# Patient Record
Sex: FEMALE | Race: WHITE | NOT HISPANIC OR LATINO | URBAN - METROPOLITAN AREA
[De-identification: names, ages, dates, MRNs, and addresses within clinical notes are randomized per-mention and may not be internally consistent; named-entity substitution may affect disease eponyms.]

---

## 2024-08-10 ENCOUNTER — APPOINTMENT (OUTPATIENT)
Dept: RADIOLOGY | Facility: MEDICAL CENTER | Age: 24
DRG: 201 | End: 2024-08-10
Attending: SURGERY
Payer: COMMERCIAL

## 2024-08-10 ENCOUNTER — APPOINTMENT (OUTPATIENT)
Dept: RADIOLOGY | Facility: MEDICAL CENTER | Age: 24
DRG: 201 | End: 2024-08-10
Attending: EMERGENCY MEDICINE
Payer: COMMERCIAL

## 2024-08-10 ENCOUNTER — HOSPITAL ENCOUNTER (INPATIENT)
Facility: MEDICAL CENTER | Age: 24
LOS: 6 days | DRG: 201 | End: 2024-08-16
Attending: EMERGENCY MEDICINE | Admitting: SURGERY
Payer: COMMERCIAL

## 2024-08-10 DIAGNOSIS — J93.9 BILATERAL PNEUMOTHORACES: ICD-10-CM
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
## 2024-08-10 DIAGNOSIS — S27.0XXA TRAUMATIC PNEUMOTHORAX, INITIAL ENCOUNTER: ICD-10-CM

## 2024-08-10 PROBLEM — T14.90XA TRAUMA: Status: ACTIVE | Noted: 2024-08-10

## 2024-08-10 PROBLEM — Z78.9 NO CONTRAINDICATION TO DEEP VEIN THROMBOSIS (DVT) PROPHYLAXIS: Status: ACTIVE | Noted: 2024-08-10

## 2024-08-10 LAB
ABO GROUP BLD: NORMAL
ALBUMIN SERPL BCP-MCNC: 3.7 G/DL (ref 3.2–4.9)
ALBUMIN/GLOB SERPL: 1.5 G/DL
ALP SERPL-CCNC: 73 U/L (ref 30–99)
ALT SERPL-CCNC: 11 U/L (ref 2–50)
ANION GAP SERPL CALC-SCNC: 11 MMOL/L (ref 7–16)
APTT PPP: 22.4 SEC (ref 24.7–36)
AST SERPL-CCNC: 23 U/L (ref 12–45)
BILIRUB SERPL-MCNC: 0.2 MG/DL (ref 0.1–1.5)
BLD GP AB SCN SERPL QL: NORMAL
BUN SERPL-MCNC: 7 MG/DL (ref 8–22)
CALCIUM ALBUM COR SERPL-MCNC: 8.5 MG/DL (ref 8.5–10.5)
CALCIUM SERPL-MCNC: 8.3 MG/DL (ref 8.5–10.5)
CHLORIDE SERPL-SCNC: 106 MMOL/L (ref 96–112)
CO2 SERPL-SCNC: 21 MMOL/L (ref 20–33)
CREAT SERPL-MCNC: 0.59 MG/DL (ref 0.5–1.4)
ERYTHROCYTE [DISTWIDTH] IN BLOOD BY AUTOMATED COUNT: 48.5 FL (ref 35.9–50)
ETHANOL BLD-MCNC: <10.1 MG/DL
GFR SERPLBLD CREATININE-BSD FMLA CKD-EPI: 130 ML/MIN/1.73 M 2
GLOBULIN SER CALC-MCNC: 2.5 G/DL (ref 1.9–3.5)
GLUCOSE SERPL-MCNC: 147 MG/DL (ref 65–99)
HCG SERPL QL: NEGATIVE
HCT VFR BLD AUTO: 36.6 % (ref 37–47)
HGB BLD-MCNC: 12 G/DL (ref 12–16)
INR PPP: 1.17 (ref 0.87–1.13)
MCH RBC QN AUTO: 31.4 PG (ref 27–33)
MCHC RBC AUTO-ENTMCNC: 32.8 G/DL (ref 32.2–35.5)
MCV RBC AUTO: 95.8 FL (ref 81.4–97.8)
PLATELET # BLD AUTO: 154 K/UL (ref 164–446)
PMV BLD AUTO: 10.7 FL (ref 9–12.9)
POTASSIUM SERPL-SCNC: 3.3 MMOL/L (ref 3.6–5.5)
PROT SERPL-MCNC: 6.2 G/DL (ref 6–8.2)
PROTHROMBIN TIME: 15 SEC (ref 12–14.6)
RBC # BLD AUTO: 3.82 M/UL (ref 4.2–5.4)
RH BLD: NORMAL
SODIUM SERPL-SCNC: 138 MMOL/L (ref 135–145)
WBC # BLD AUTO: 8.5 K/UL (ref 4.8–10.8)

## 2024-08-10 PROCEDURE — 86901 BLOOD TYPING SEROLOGIC RH(D): CPT

## 2024-08-10 PROCEDURE — 305948 HCHG GREEN TRAUMA ACT PRE-NOTIFY NO CC

## 2024-08-10 PROCEDURE — 700117 HCHG RX CONTRAST REV CODE 255: Performed by: EMERGENCY MEDICINE

## 2024-08-10 PROCEDURE — 82077 ASSAY SPEC XCP UR&BREATH IA: CPT

## 2024-08-10 PROCEDURE — 36415 COLL VENOUS BLD VENIPUNCTURE: CPT

## 2024-08-10 PROCEDURE — 85730 THROMBOPLASTIN TIME PARTIAL: CPT

## 2024-08-10 PROCEDURE — 85610 PROTHROMBIN TIME: CPT

## 2024-08-10 PROCEDURE — 71045 X-RAY EXAM CHEST 1 VIEW: CPT

## 2024-08-10 PROCEDURE — 0W9B30Z DRAINAGE OF LEFT PLEURAL CAVITY WITH DRAINAGE DEVICE, PERCUTANEOUS APPROACH: ICD-10-PCS | Performed by: EMERGENCY MEDICINE

## 2024-08-10 PROCEDURE — 80053 COMPREHEN METABOLIC PANEL: CPT

## 2024-08-10 PROCEDURE — 96375 TX/PRO/DX INJ NEW DRUG ADDON: CPT

## 2024-08-10 PROCEDURE — 700105 HCHG RX REV CODE 258: Performed by: EMERGENCY MEDICINE

## 2024-08-10 PROCEDURE — 85027 COMPLETE CBC AUTOMATED: CPT

## 2024-08-10 PROCEDURE — 700101 HCHG RX REV CODE 250: Performed by: EMERGENCY MEDICINE

## 2024-08-10 PROCEDURE — 32551 INSERTION OF CHEST TUBE: CPT

## 2024-08-10 PROCEDURE — 70450 CT HEAD/BRAIN W/O DYE: CPT

## 2024-08-10 PROCEDURE — 700111 HCHG RX REV CODE 636 W/ 250 OVERRIDE (IP): Performed by: EMERGENCY MEDICINE

## 2024-08-10 PROCEDURE — 86850 RBC ANTIBODY SCREEN: CPT

## 2024-08-10 PROCEDURE — 71260 CT THORAX DX C+: CPT

## 2024-08-10 PROCEDURE — 700101 HCHG RX REV CODE 250: Performed by: NURSE PRACTITIONER

## 2024-08-10 PROCEDURE — 96365 THER/PROPH/DIAG IV INF INIT: CPT

## 2024-08-10 PROCEDURE — 73060 X-RAY EXAM OF HUMERUS: CPT | Mod: LT

## 2024-08-10 PROCEDURE — 86900 BLOOD TYPING SEROLOGIC ABO: CPT

## 2024-08-10 PROCEDURE — 700105 HCHG RX REV CODE 258: Performed by: NURSE PRACTITIONER

## 2024-08-10 PROCEDURE — 99285 EMERGENCY DEPT VISIT HI MDM: CPT

## 2024-08-10 PROCEDURE — 73090 X-RAY EXAM OF FOREARM: CPT | Mod: LT

## 2024-08-10 PROCEDURE — 770001 HCHG ROOM/CARE - MED/SURG/GYN PRIV*

## 2024-08-10 PROCEDURE — A9270 NON-COVERED ITEM OR SERVICE: HCPCS | Performed by: NURSE PRACTITIONER

## 2024-08-10 PROCEDURE — 700102 HCHG RX REV CODE 250 W/ 637 OVERRIDE(OP): Performed by: NURSE PRACTITIONER

## 2024-08-10 PROCEDURE — 84703 CHORIONIC GONADOTROPIN ASSAY: CPT

## 2024-08-10 RX ORDER — ONDANSETRON 4 MG/1
4 TABLET, ORALLY DISINTEGRATING ORAL EVERY 4 HOURS PRN
Status: DISCONTINUED | OUTPATIENT
Start: 2024-08-10 | End: 2024-08-16 | Stop reason: HOSPADM

## 2024-08-10 RX ORDER — MIDAZOLAM HYDROCHLORIDE 1 MG/ML
1-5 INJECTION INTRAMUSCULAR; INTRAVENOUS ONCE
Status: COMPLETED | OUTPATIENT
Start: 2024-08-10 | End: 2024-08-10

## 2024-08-10 RX ORDER — CELECOXIB 200 MG/1
200 CAPSULE ORAL 2 TIMES DAILY PRN
Status: DISCONTINUED | OUTPATIENT
Start: 2024-08-15 | End: 2024-08-16 | Stop reason: HOSPADM

## 2024-08-10 RX ORDER — OXYCODONE HYDROCHLORIDE 10 MG/1
10 TABLET ORAL
Status: DISCONTINUED | OUTPATIENT
Start: 2024-08-10 | End: 2024-08-16 | Stop reason: HOSPADM

## 2024-08-10 RX ORDER — DOCUSATE SODIUM 100 MG/1
100 CAPSULE, LIQUID FILLED ORAL 2 TIMES DAILY
Status: DISCONTINUED | OUTPATIENT
Start: 2024-08-10 | End: 2024-08-16 | Stop reason: HOSPADM

## 2024-08-10 RX ORDER — HYDROMORPHONE HYDROCHLORIDE 1 MG/ML
0.5 INJECTION, SOLUTION INTRAMUSCULAR; INTRAVENOUS; SUBCUTANEOUS ONCE
Status: COMPLETED | OUTPATIENT
Start: 2024-08-10 | End: 2024-08-10

## 2024-08-10 RX ORDER — ACETAMINOPHEN 500 MG
1000 TABLET ORAL EVERY 6 HOURS PRN
Status: DISCONTINUED | OUTPATIENT
Start: 2024-08-15 | End: 2024-08-16 | Stop reason: HOSPADM

## 2024-08-10 RX ORDER — LIDOCAINE HYDROCHLORIDE AND EPINEPHRINE BITARTRATE 20; .01 MG/ML; MG/ML
10 INJECTION, SOLUTION SUBCUTANEOUS ONCE
Status: COMPLETED | OUTPATIENT
Start: 2024-08-10 | End: 2024-08-10

## 2024-08-10 RX ORDER — METAXALONE 800 MG/1
800 TABLET ORAL 3 TIMES DAILY
Status: DISCONTINUED | OUTPATIENT
Start: 2024-08-10 | End: 2024-08-16 | Stop reason: HOSPADM

## 2024-08-10 RX ORDER — ENOXAPARIN SODIUM 100 MG/ML
30 INJECTION SUBCUTANEOUS EVERY 12 HOURS
Status: DISCONTINUED | OUTPATIENT
Start: 2024-08-11 | End: 2024-08-16 | Stop reason: HOSPADM

## 2024-08-10 RX ORDER — AMOXICILLIN 250 MG
1 CAPSULE ORAL NIGHTLY
Status: DISCONTINUED | OUTPATIENT
Start: 2024-08-10 | End: 2024-08-16 | Stop reason: HOSPADM

## 2024-08-10 RX ORDER — SODIUM CHLORIDE, SODIUM LACTATE, POTASSIUM CHLORIDE, CALCIUM CHLORIDE 600; 310; 30; 20 MG/100ML; MG/100ML; MG/100ML; MG/100ML
INJECTION, SOLUTION INTRAVENOUS CONTINUOUS
Status: DISCONTINUED | OUTPATIENT
Start: 2024-08-10 | End: 2024-08-11

## 2024-08-10 RX ORDER — LIDOCAINE 4 G/G
1 PATCH TOPICAL EVERY 24 HOURS
Status: DISCONTINUED | OUTPATIENT
Start: 2024-08-10 | End: 2024-08-16 | Stop reason: HOSPADM

## 2024-08-10 RX ORDER — GABAPENTIN 100 MG/1
100 CAPSULE ORAL EVERY 8 HOURS
Status: DISCONTINUED | OUTPATIENT
Start: 2024-08-10 | End: 2024-08-16 | Stop reason: HOSPADM

## 2024-08-10 RX ORDER — ACETAMINOPHEN 500 MG
1000 TABLET ORAL EVERY 6 HOURS
Status: DISPENSED | OUTPATIENT
Start: 2024-08-10 | End: 2024-08-15

## 2024-08-10 RX ORDER — HYDROMORPHONE HYDROCHLORIDE 1 MG/ML
0.5 INJECTION, SOLUTION INTRAMUSCULAR; INTRAVENOUS; SUBCUTANEOUS
Status: DISCONTINUED | OUTPATIENT
Start: 2024-08-10 | End: 2024-08-16 | Stop reason: HOSPADM

## 2024-08-10 RX ORDER — BISACODYL 10 MG
10 SUPPOSITORY, RECTAL RECTAL
Status: DISCONTINUED | OUTPATIENT
Start: 2024-08-10 | End: 2024-08-16 | Stop reason: HOSPADM

## 2024-08-10 RX ORDER — MIDAZOLAM HYDROCHLORIDE 1 MG/ML
INJECTION INTRAMUSCULAR; INTRAVENOUS
Status: COMPLETED | OUTPATIENT
Start: 2024-08-10 | End: 2024-08-10

## 2024-08-10 RX ORDER — AMOXICILLIN 250 MG
1 CAPSULE ORAL
Status: DISCONTINUED | OUTPATIENT
Start: 2024-08-10 | End: 2024-08-16 | Stop reason: HOSPADM

## 2024-08-10 RX ORDER — POLYETHYLENE GLYCOL 3350 17 G/17G
1 POWDER, FOR SOLUTION ORAL 2 TIMES DAILY
Status: DISCONTINUED | OUTPATIENT
Start: 2024-08-10 | End: 2024-08-16 | Stop reason: HOSPADM

## 2024-08-10 RX ORDER — MORPHINE SULFATE 4 MG/ML
INJECTION INTRAVENOUS
Status: COMPLETED | OUTPATIENT
Start: 2024-08-10 | End: 2024-08-10

## 2024-08-10 RX ORDER — CELECOXIB 200 MG/1
200 CAPSULE ORAL 2 TIMES DAILY
Status: COMPLETED | OUTPATIENT
Start: 2024-08-10 | End: 2024-08-15

## 2024-08-10 RX ORDER — OXYCODONE HYDROCHLORIDE 5 MG/1
5 TABLET ORAL
Status: DISCONTINUED | OUTPATIENT
Start: 2024-08-10 | End: 2024-08-16 | Stop reason: HOSPADM

## 2024-08-10 RX ORDER — ONDANSETRON 2 MG/ML
4 INJECTION INTRAMUSCULAR; INTRAVENOUS EVERY 4 HOURS PRN
Status: DISCONTINUED | OUTPATIENT
Start: 2024-08-10 | End: 2024-08-16 | Stop reason: HOSPADM

## 2024-08-10 RX ADMIN — LIDOCAINE 1 PATCH: 4 PATCH TOPICAL at 19:44

## 2024-08-10 RX ADMIN — MORPHINE SULFATE 4 MG: 4 INJECTION, SOLUTION INTRAMUSCULAR; INTRAVENOUS at 17:16

## 2024-08-10 RX ADMIN — SODIUM CHLORIDE, POTASSIUM CHLORIDE, SODIUM LACTATE AND CALCIUM CHLORIDE: 600; 310; 30; 20 INJECTION, SOLUTION INTRAVENOUS at 19:48

## 2024-08-10 RX ADMIN — MIDAZOLAM HYDROCHLORIDE 2 MG: 1 INJECTION, SOLUTION INTRAMUSCULAR; INTRAVENOUS at 17:15

## 2024-08-10 RX ADMIN — MIDAZOLAM HYDROCHLORIDE 2 MG: 2 INJECTION, SOLUTION INTRAMUSCULAR; INTRAVENOUS at 17:05

## 2024-08-10 RX ADMIN — MIDAZOLAM HYDROCHLORIDE 1 MG: 2 INJECTION, SOLUTION INTRAMUSCULAR; INTRAVENOUS at 17:11

## 2024-08-10 RX ADMIN — LIDOCAINE HYDROCHLORIDE,EPINEPHRINE BITARTRATE 10 ML: 20; .01 INJECTION, SOLUTION INFILTRATION; PERINEURAL at 17:48

## 2024-08-10 RX ADMIN — IOHEXOL 100 ML: 350 INJECTION, SOLUTION INTRAVENOUS at 17:25

## 2024-08-10 RX ADMIN — HYDROMORPHONE HYDROCHLORIDE 0.5 MG: 1 INJECTION, SOLUTION INTRAMUSCULAR; INTRAVENOUS; SUBCUTANEOUS at 17:46

## 2024-08-10 RX ADMIN — CELECOXIB 200 MG: 200 CAPSULE ORAL at 19:43

## 2024-08-10 RX ADMIN — GABAPENTIN 100 MG: 100 CAPSULE ORAL at 22:50

## 2024-08-10 RX ADMIN — CEFAZOLIN 2 G: 2 INJECTION, POWDER, FOR SOLUTION INTRAMUSCULAR; INTRAVENOUS at 20:11

## 2024-08-10 RX ADMIN — ACETAMINOPHEN 1000 MG: 500 TABLET ORAL at 19:43

## 2024-08-10 RX ADMIN — METAXALONE 800 MG: 800 TABLET ORAL at 19:43

## 2024-08-10 ASSESSMENT — COPD QUESTIONNAIRES
COPD SCREENING SCORE: 2
DURING THE PAST 4 WEEKS HOW MUCH DID YOU FEEL SHORT OF BREATH: NONE/LITTLE OF THE TIME
HAVE YOU SMOKED AT LEAST 100 CIGARETTES IN YOUR ENTIRE LIFE: YES
DO YOU EVER COUGH UP ANY MUCUS OR PHLEGM?: NO/ONLY WITH OCCASIONAL COLDS OR INFECTIONS

## 2024-08-10 ASSESSMENT — PAIN DESCRIPTION - PAIN TYPE: TYPE: ACUTE PAIN

## 2024-08-10 NOTE — LETTER
August 16, 2024       Patient: Kaylyn Peña   YOB: 2000   Date of Visit: 8/3/2024         To Whom It May Concern:    In my medical opinion, I recommend that Kaylyn Peña return to work on light duty with the following restrictions no heavy lifting greater than 20 lb for 1 week.     If you have any questions or concerns, please don't hesitate to call            Sincerely,      Bernadine TARANGO.

## 2024-08-10 NOTE — ASSESSMENT & PLAN NOTE
Left sided pneumothorax.  Chest tube placed in ED  -20 suction  8/11 trace left pneumothorax on morning CXR.  -Grade 1 air leak, scant output.   -Continue to suction.   8/12 No pneumothorax  on morning CXR. Scant serous output.  -chest tube to water seal  8/13 trace left pneumothorax on morning CXR, increase in subcutaneous air.   8/14 trace BILATERAL pneumothorax. Left decreased in size, right new.   -No air leak, no output to left chest tube  -Continue chest tube to water seal.   8/15 Bilateral penumothoraces stable.  -continue chest tube to water seal left chest tube.   8/16 Chest tube dislodge overnight into subcutaneous tissue. Removed with out complication.  -CXR this afternoon.   Aggressive pulmonary hygiene and multimodal pain management and serial chest radiography.

## 2024-08-11 ENCOUNTER — APPOINTMENT (OUTPATIENT)
Dept: RADIOLOGY | Facility: MEDICAL CENTER | Age: 24
DRG: 201 | End: 2024-08-11
Attending: NURSE PRACTITIONER
Payer: COMMERCIAL

## 2024-08-11 LAB
ABO + RH BLD: NORMAL
ANION GAP SERPL CALC-SCNC: 10 MMOL/L (ref 7–16)
BASOPHILS # BLD AUTO: 0.4 % (ref 0–1.8)
BASOPHILS # BLD: 0.03 K/UL (ref 0–0.12)
BUN SERPL-MCNC: 5 MG/DL (ref 8–22)
CALCIUM SERPL-MCNC: 8.6 MG/DL (ref 8.5–10.5)
CHLORIDE SERPL-SCNC: 106 MMOL/L (ref 96–112)
CO2 SERPL-SCNC: 24 MMOL/L (ref 20–33)
CREAT SERPL-MCNC: 0.55 MG/DL (ref 0.5–1.4)
EOSINOPHIL # BLD AUTO: 0.1 K/UL (ref 0–0.51)
EOSINOPHIL NFR BLD: 1.2 % (ref 0–6.9)
ERYTHROCYTE [DISTWIDTH] IN BLOOD BY AUTOMATED COUNT: 49.5 FL (ref 35.9–50)
GFR SERPLBLD CREATININE-BSD FMLA CKD-EPI: 132 ML/MIN/1.73 M 2
GLUCOSE BLD STRIP.AUTO-MCNC: 96 MG/DL (ref 65–99)
GLUCOSE SERPL-MCNC: 106 MG/DL (ref 65–99)
HCT VFR BLD AUTO: 39.2 % (ref 37–47)
HGB BLD-MCNC: 12.7 G/DL (ref 12–16)
IMM GRANULOCYTES # BLD AUTO: 0.03 K/UL (ref 0–0.11)
IMM GRANULOCYTES NFR BLD AUTO: 0.4 % (ref 0–0.9)
LYMPHOCYTES # BLD AUTO: 1.79 K/UL (ref 1–4.8)
LYMPHOCYTES NFR BLD: 21.6 % (ref 22–41)
MCH RBC QN AUTO: 30.9 PG (ref 27–33)
MCHC RBC AUTO-ENTMCNC: 32.4 G/DL (ref 32.2–35.5)
MCV RBC AUTO: 95.4 FL (ref 81.4–97.8)
MONOCYTES # BLD AUTO: 0.6 K/UL (ref 0–0.85)
MONOCYTES NFR BLD AUTO: 7.2 % (ref 0–13.4)
NEUTROPHILS # BLD AUTO: 5.75 K/UL (ref 1.82–7.42)
NEUTROPHILS NFR BLD: 69.2 % (ref 44–72)
NRBC # BLD AUTO: 0 K/UL
NRBC BLD-RTO: 0 /100 WBC (ref 0–0.2)
PLATELET # BLD AUTO: 195 K/UL (ref 164–446)
PMV BLD AUTO: 11.1 FL (ref 9–12.9)
POTASSIUM SERPL-SCNC: 3.8 MMOL/L (ref 3.6–5.5)
RBC # BLD AUTO: 4.11 M/UL (ref 4.2–5.4)
SODIUM SERPL-SCNC: 140 MMOL/L (ref 135–145)
WBC # BLD AUTO: 8.3 K/UL (ref 4.8–10.8)

## 2024-08-11 PROCEDURE — 700101 HCHG RX REV CODE 250: Performed by: NURSE PRACTITIONER

## 2024-08-11 PROCEDURE — 97535 SELF CARE MNGMENT TRAINING: CPT

## 2024-08-11 PROCEDURE — A9270 NON-COVERED ITEM OR SERVICE: HCPCS | Performed by: NURSE PRACTITIONER

## 2024-08-11 PROCEDURE — 700111 HCHG RX REV CODE 636 W/ 250 OVERRIDE (IP): Mod: JZ | Performed by: NURSE PRACTITIONER

## 2024-08-11 PROCEDURE — 99232 SBSQ HOSP IP/OBS MODERATE 35: CPT | Performed by: REGISTERED NURSE

## 2024-08-11 PROCEDURE — 82962 GLUCOSE BLOOD TEST: CPT

## 2024-08-11 PROCEDURE — 700102 HCHG RX REV CODE 250 W/ 637 OVERRIDE(OP): Performed by: NURSE PRACTITIONER

## 2024-08-11 PROCEDURE — 97161 PT EVAL LOW COMPLEX 20 MIN: CPT

## 2024-08-11 PROCEDURE — 80048 BASIC METABOLIC PNL TOTAL CA: CPT

## 2024-08-11 PROCEDURE — 36415 COLL VENOUS BLD VENIPUNCTURE: CPT

## 2024-08-11 PROCEDURE — 85025 COMPLETE CBC W/AUTO DIFF WBC: CPT

## 2024-08-11 PROCEDURE — 94669 MECHANICAL CHEST WALL OSCILL: CPT

## 2024-08-11 PROCEDURE — 770001 HCHG ROOM/CARE - MED/SURG/GYN PRIV*

## 2024-08-11 PROCEDURE — 71045 X-RAY EXAM CHEST 1 VIEW: CPT

## 2024-08-11 RX ORDER — PROCHLORPERAZINE EDISYLATE 5 MG/ML
5 INJECTION INTRAMUSCULAR; INTRAVENOUS EVERY 6 HOURS PRN
Status: DISCONTINUED | OUTPATIENT
Start: 2024-08-11 | End: 2024-08-16 | Stop reason: HOSPADM

## 2024-08-11 RX ADMIN — CELECOXIB 200 MG: 200 CAPSULE ORAL at 04:11

## 2024-08-11 RX ADMIN — CELECOXIB 200 MG: 200 CAPSULE ORAL at 16:54

## 2024-08-11 RX ADMIN — METAXALONE 800 MG: 800 TABLET ORAL at 04:11

## 2024-08-11 RX ADMIN — ACETAMINOPHEN 1000 MG: 500 TABLET ORAL at 16:53

## 2024-08-11 RX ADMIN — METAXALONE 800 MG: 800 TABLET ORAL at 12:01

## 2024-08-11 RX ADMIN — PROCHLORPERAZINE EDISYLATE 5 MG: 5 INJECTION INTRAMUSCULAR; INTRAVENOUS at 22:11

## 2024-08-11 RX ADMIN — OXYCODONE HYDROCHLORIDE 10 MG: 10 TABLET ORAL at 02:32

## 2024-08-11 RX ADMIN — ENOXAPARIN SODIUM 30 MG: 100 INJECTION SUBCUTANEOUS at 18:19

## 2024-08-11 RX ADMIN — ONDANSETRON 4 MG: 2 INJECTION INTRAMUSCULAR; INTRAVENOUS at 04:45

## 2024-08-11 RX ADMIN — ACETAMINOPHEN 1000 MG: 500 TABLET ORAL at 12:01

## 2024-08-11 RX ADMIN — METAXALONE 800 MG: 800 TABLET ORAL at 16:54

## 2024-08-11 RX ADMIN — HYDROMORPHONE HYDROCHLORIDE 0.5 MG: 1 INJECTION, SOLUTION INTRAMUSCULAR; INTRAVENOUS; SUBCUTANEOUS at 03:58

## 2024-08-11 RX ADMIN — ONDANSETRON 4 MG: 2 INJECTION INTRAMUSCULAR; INTRAVENOUS at 18:02

## 2024-08-11 RX ADMIN — DOCUSATE SODIUM 100 MG: 100 CAPSULE, LIQUID FILLED ORAL at 16:55

## 2024-08-11 RX ADMIN — GABAPENTIN 100 MG: 100 CAPSULE ORAL at 04:11

## 2024-08-11 RX ADMIN — OXYCODONE HYDROCHLORIDE 10 MG: 10 TABLET ORAL at 09:24

## 2024-08-11 RX ADMIN — OXYCODONE HYDROCHLORIDE 10 MG: 10 TABLET ORAL at 16:55

## 2024-08-11 RX ADMIN — LIDOCAINE 1 PATCH: 4 PATCH TOPICAL at 21:12

## 2024-08-11 RX ADMIN — GABAPENTIN 100 MG: 100 CAPSULE ORAL at 21:12

## 2024-08-11 RX ADMIN — OXYCODONE HYDROCHLORIDE 10 MG: 10 TABLET ORAL at 06:13

## 2024-08-11 RX ADMIN — ONDANSETRON 4 MG: 2 INJECTION INTRAMUSCULAR; INTRAVENOUS at 21:12

## 2024-08-11 RX ADMIN — SENNOSIDES AND DOCUSATE SODIUM 1 TABLET: 50; 8.6 TABLET ORAL at 21:12

## 2024-08-11 RX ADMIN — GABAPENTIN 100 MG: 100 CAPSULE ORAL at 15:43

## 2024-08-11 RX ADMIN — ACETAMINOPHEN 1000 MG: 500 TABLET ORAL at 04:11

## 2024-08-11 SDOH — ECONOMIC STABILITY: TRANSPORTATION INSECURITY
IN THE PAST 12 MONTHS, HAS LACK OF RELIABLE TRANSPORTATION KEPT YOU FROM MEDICAL APPOINTMENTS, MEETINGS, WORK OR FROM GETTING THINGS NEEDED FOR DAILY LIVING?: YES

## 2024-08-11 SDOH — ECONOMIC STABILITY: TRANSPORTATION INSECURITY
IN THE PAST 12 MONTHS, HAS THE LACK OF TRANSPORTATION KEPT YOU FROM MEDICAL APPOINTMENTS OR FROM GETTING MEDICATIONS?: YES

## 2024-08-11 ASSESSMENT — COGNITIVE AND FUNCTIONAL STATUS - GENERAL
SUGGESTED CMS G CODE MODIFIER MOBILITY: CH
DAILY ACTIVITIY SCORE: 24
SUGGESTED CMS G CODE MODIFIER MOBILITY: CH
MOBILITY SCORE: 24
MOBILITY SCORE: 24
SUGGESTED CMS G CODE MODIFIER DAILY ACTIVITY: CH

## 2024-08-11 ASSESSMENT — PAIN DESCRIPTION - PAIN TYPE
TYPE: ACUTE PAIN

## 2024-08-11 ASSESSMENT — LIFESTYLE VARIABLES
HAVE PEOPLE ANNOYED YOU BY CRITICIZING YOUR DRINKING: NO
DOES PATIENT WANT TO STOP DRINKING: NO
HAVE YOU EVER FELT YOU SHOULD CUT DOWN ON YOUR DRINKING: NO
HOW MANY TIMES IN THE PAST YEAR HAVE YOU HAD 5 OR MORE DRINKS IN A DAY: 0
ON A TYPICAL DAY WHEN YOU DRINK ALCOHOL HOW MANY DRINKS DO YOU HAVE: 2
CONSUMPTION TOTAL: NEGATIVE
TOTAL SCORE: 0
TOTAL SCORE: 0
EVER HAD A DRINK FIRST THING IN THE MORNING TO STEADY YOUR NERVES TO GET RID OF A HANGOVER: NO
ALCOHOL_USE: YES
AVERAGE NUMBER OF DAYS PER WEEK YOU HAVE A DRINK CONTAINING ALCOHOL: 0
TOTAL SCORE: 0
EVER FELT BAD OR GUILTY ABOUT YOUR DRINKING: NO

## 2024-08-11 ASSESSMENT — ENCOUNTER SYMPTOMS
FOCAL WEAKNESS: 0
MUSCULOSKELETAL NEGATIVE: 1
RESPIRATORY NEGATIVE: 1
GASTROINTESTINAL NEGATIVE: 1
EYES NEGATIVE: 1
CARDIOVASCULAR NEGATIVE: 1
PSYCHIATRIC NEGATIVE: 1
DOUBLE VISION: 0
DIZZINESS: 0
NEUROLOGICAL NEGATIVE: 1
BLURRED VISION: 0

## 2024-08-11 ASSESSMENT — PATIENT HEALTH QUESTIONNAIRE - PHQ9
2. FEELING DOWN, DEPRESSED, IRRITABLE, OR HOPELESS: NOT AT ALL
SUM OF ALL RESPONSES TO PHQ9 QUESTIONS 1 AND 2: 0
1. LITTLE INTEREST OR PLEASURE IN DOING THINGS: NOT AT ALL

## 2024-08-11 ASSESSMENT — GAIT ASSESSMENTS
GAIT LEVEL OF ASSIST: SUPERVISED
DISTANCE (FEET): 500

## 2024-08-11 ASSESSMENT — SOCIAL DETERMINANTS OF HEALTH (SDOH)
IN THE PAST 12 MONTHS, HAS THE ELECTRIC, GAS, OIL, OR WATER COMPANY THREATENED TO SHUT OFF SERVICE IN YOUR HOME?: NO
WITHIN THE LAST YEAR, HAVE YOU BEEN KICKED, HIT, SLAPPED, OR OTHERWISE PHYSICALLY HURT BY YOUR PARTNER OR EX-PARTNER?: NO
WITHIN THE LAST YEAR, HAVE YOU BEEN HUMILIATED OR EMOTIONALLY ABUSED IN OTHER WAYS BY YOUR PARTNER OR EX-PARTNER?: NO
WITHIN THE PAST 12 MONTHS, YOU WORRIED THAT YOUR FOOD WOULD RUN OUT BEFORE YOU GOT THE MONEY TO BUY MORE: NEVER TRUE
WITHIN THE LAST YEAR, HAVE TO BEEN RAPED OR FORCED TO HAVE ANY KIND OF SEXUAL ACTIVITY BY YOUR PARTNER OR EX-PARTNER?: NO
WITHIN THE LAST YEAR, HAVE YOU BEEN AFRAID OF YOUR PARTNER OR EX-PARTNER?: NO
WITHIN THE PAST 12 MONTHS, THE FOOD YOU BOUGHT JUST DIDN'T LAST AND YOU DIDN'T HAVE MONEY TO GET MORE: NEVER TRUE
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
## 2024-08-11 NOTE — ED NOTES
Report given to Gisel.     Gisel said they are still finishing cleaning the room. Will call back once ready
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
## 2024-08-11 NOTE — CARE PLAN
The patient is Stable - Low risk of patient condition declining or worsening    Shift Goals  Clinical Goals: monitor chest tube, pain control  Patient Goals: rest, pain control  Family Goals: update on POC    Progress made toward(s) clinical / shift goals:  Pt medicated per MAR, resting. Chest tube patent, to -20 suction. Pt pain controlled through orders. Pt ambulated to commode with staff, calls appropriately. Updated pt and pt family on POC.    Patient is not progressing towards the following goals:
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
## 2024-08-11 NOTE — PROGRESS NOTES
Assumed care of patient at 1845  Bedside Report Received     Pt AO x 4  Vitals signs stable  Pt denies SOB  O2 sat >90% on RA breathing unlabored   Pulls 2000 on the IS. Pt has good/strong effort.   Pt endorses 7/10 L chest/flank pain, PRNs given. Heat pack given for back/muscle spasms   L anterior chest tube, dressing CDI. -20 suction at all times, portable suction at bedside for ambulation. No Air leak noted.   Lacerations to LUE. Cleaned and dressed with petroleum gauze.   Pt denies N/V/D  Pt is tolerating regular diet, pt states that she isn't very hungry though.   + voiding  + flatus, last BM PTA, Bowel sounds present   Pt ambulates self to standby      Plan of care discussed with pt. Safety education done. Falls precautions in place. Call light and belongings within reach. All needs met at this time.

## 2024-08-11 NOTE — ED NOTES
Pt resting on gurney, pt in no acute distress, pt provided call light, instructed to call if needing any assistance, instructed not to get up by self, nancy in lowest position.   Family at bedside

## 2024-08-11 NOTE — ED NOTES
Pt sleeping on gurney, pt in no acute distress, pt provided call light, instructed to call if needing any assistance, instructed not to get up by self, almazrwilder in lowest position.   Family at bedside
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
## 2024-08-11 NOTE — ED NOTES
Medication history reviewed with PT at bedside    [facility] rec is complete per PT reporting    Allergies reviewed.     Patient denies any outpatient antibiotics in the last 30 days.     Patient is not taking anticoagulants.    Preferred pharmacy for this visit - [facility] on [address] ([phone])

## 2024-08-12 ENCOUNTER — APPOINTMENT (OUTPATIENT)
Dept: RADIOLOGY | Facility: MEDICAL CENTER | Age: 24
DRG: 201 | End: 2024-08-12
Attending: NURSE PRACTITIONER
Payer: COMMERCIAL

## 2024-08-12 ENCOUNTER — APPOINTMENT (OUTPATIENT)
Dept: RADIOLOGY | Facility: MEDICAL CENTER | Age: 24
DRG: 201 | End: 2024-08-12
Attending: REGISTERED NURSE
Payer: COMMERCIAL

## 2024-08-12 LAB
ANION GAP SERPL CALC-SCNC: 9 MMOL/L (ref 7–16)
BASOPHILS # BLD AUTO: 0.6 % (ref 0–1.8)
BASOPHILS # BLD: 0.04 K/UL (ref 0–0.12)
BUN SERPL-MCNC: 6 MG/DL (ref 8–22)
CALCIUM SERPL-MCNC: 8.9 MG/DL (ref 8.5–10.5)
CHLORIDE SERPL-SCNC: 106 MMOL/L (ref 96–112)
CO2 SERPL-SCNC: 24 MMOL/L (ref 20–33)
CREAT SERPL-MCNC: 0.63 MG/DL (ref 0.5–1.4)
EOSINOPHIL # BLD AUTO: 0.19 K/UL (ref 0–0.51)
EOSINOPHIL NFR BLD: 3 % (ref 0–6.9)
ERYTHROCYTE [DISTWIDTH] IN BLOOD BY AUTOMATED COUNT: 49.5 FL (ref 35.9–50)
GFR SERPLBLD CREATININE-BSD FMLA CKD-EPI: 128 ML/MIN/1.73 M 2
GLUCOSE SERPL-MCNC: 100 MG/DL (ref 65–99)
HCT VFR BLD AUTO: 39.6 % (ref 37–47)
HGB BLD-MCNC: 13.2 G/DL (ref 12–16)
IMM GRANULOCYTES # BLD AUTO: 0.01 K/UL (ref 0–0.11)
IMM GRANULOCYTES NFR BLD AUTO: 0.2 % (ref 0–0.9)
LYMPHOCYTES # BLD AUTO: 1.63 K/UL (ref 1–4.8)
LYMPHOCYTES NFR BLD: 25.8 % (ref 22–41)
MAGNESIUM SERPL-MCNC: 1.7 MG/DL (ref 1.5–2.5)
MCH RBC QN AUTO: 31.7 PG (ref 27–33)
MCHC RBC AUTO-ENTMCNC: 33.3 G/DL (ref 32.2–35.5)
MCV RBC AUTO: 95.2 FL (ref 81.4–97.8)
MONOCYTES # BLD AUTO: 0.5 K/UL (ref 0–0.85)
MONOCYTES NFR BLD AUTO: 7.9 % (ref 0–13.4)
NEUTROPHILS # BLD AUTO: 3.95 K/UL (ref 1.82–7.42)
NEUTROPHILS NFR BLD: 62.5 % (ref 44–72)
NRBC # BLD AUTO: 0 K/UL
NRBC BLD-RTO: 0 /100 WBC (ref 0–0.2)
PHOSPHATE SERPL-MCNC: 3.3 MG/DL (ref 2.5–4.5)
PLATELET # BLD AUTO: 193 K/UL (ref 164–446)
PMV BLD AUTO: 10.6 FL (ref 9–12.9)
POTASSIUM SERPL-SCNC: 3.9 MMOL/L (ref 3.6–5.5)
RBC # BLD AUTO: 4.16 M/UL (ref 4.2–5.4)
SODIUM SERPL-SCNC: 139 MMOL/L (ref 135–145)
WBC # BLD AUTO: 6.3 K/UL (ref 4.8–10.8)

## 2024-08-12 PROCEDURE — 94669 MECHANICAL CHEST WALL OSCILL: CPT

## 2024-08-12 PROCEDURE — 83735 ASSAY OF MAGNESIUM: CPT

## 2024-08-12 PROCEDURE — 71045 X-RAY EXAM CHEST 1 VIEW: CPT

## 2024-08-12 PROCEDURE — 84100 ASSAY OF PHOSPHORUS: CPT

## 2024-08-12 PROCEDURE — 700102 HCHG RX REV CODE 250 W/ 637 OVERRIDE(OP): Performed by: NURSE PRACTITIONER

## 2024-08-12 PROCEDURE — 73562 X-RAY EXAM OF KNEE 3: CPT | Mod: RT

## 2024-08-12 PROCEDURE — 770001 HCHG ROOM/CARE - MED/SURG/GYN PRIV*

## 2024-08-12 PROCEDURE — 36415 COLL VENOUS BLD VENIPUNCTURE: CPT

## 2024-08-12 PROCEDURE — 302096 CLEANSER BODY 4IN1 FOAM: Performed by: SURGERY

## 2024-08-12 PROCEDURE — 85025 COMPLETE CBC W/AUTO DIFF WBC: CPT

## 2024-08-12 PROCEDURE — 99232 SBSQ HOSP IP/OBS MODERATE 35: CPT | Performed by: REGISTERED NURSE

## 2024-08-12 PROCEDURE — 80048 BASIC METABOLIC PNL TOTAL CA: CPT

## 2024-08-12 PROCEDURE — 700111 HCHG RX REV CODE 636 W/ 250 OVERRIDE (IP): Mod: JZ | Performed by: NURSE PRACTITIONER

## 2024-08-12 PROCEDURE — 94760 N-INVAS EAR/PLS OXIMETRY 1: CPT

## 2024-08-12 PROCEDURE — A9270 NON-COVERED ITEM OR SERVICE: HCPCS | Performed by: NURSE PRACTITIONER

## 2024-08-12 PROCEDURE — 306591 TRAY SUTURE REMOVAL DISP: Performed by: SURGERY

## 2024-08-12 RX ADMIN — ONDANSETRON 4 MG: 2 INJECTION INTRAMUSCULAR; INTRAVENOUS at 04:36

## 2024-08-12 RX ADMIN — METAXALONE 800 MG: 800 TABLET ORAL at 16:53

## 2024-08-12 RX ADMIN — GABAPENTIN 100 MG: 100 CAPSULE ORAL at 20:36

## 2024-08-12 RX ADMIN — DOCUSATE SODIUM 100 MG: 100 CAPSULE, LIQUID FILLED ORAL at 16:53

## 2024-08-12 RX ADMIN — ACETAMINOPHEN 1000 MG: 500 TABLET ORAL at 04:35

## 2024-08-12 RX ADMIN — CELECOXIB 200 MG: 200 CAPSULE ORAL at 16:53

## 2024-08-12 RX ADMIN — GABAPENTIN 100 MG: 100 CAPSULE ORAL at 04:35

## 2024-08-12 RX ADMIN — DOCUSATE SODIUM 100 MG: 100 CAPSULE, LIQUID FILLED ORAL at 04:35

## 2024-08-12 RX ADMIN — OXYCODONE HYDROCHLORIDE 5 MG: 5 TABLET ORAL at 16:52

## 2024-08-12 RX ADMIN — ENOXAPARIN SODIUM 30 MG: 100 INJECTION SUBCUTANEOUS at 04:35

## 2024-08-12 RX ADMIN — CELECOXIB 200 MG: 200 CAPSULE ORAL at 04:35

## 2024-08-12 RX ADMIN — ONDANSETRON 4 MG: 2 INJECTION INTRAMUSCULAR; INTRAVENOUS at 16:58

## 2024-08-12 RX ADMIN — ACETAMINOPHEN 1000 MG: 500 TABLET ORAL at 16:53

## 2024-08-12 RX ADMIN — METAXALONE 800 MG: 800 TABLET ORAL at 12:12

## 2024-08-12 RX ADMIN — OXYCODONE HYDROCHLORIDE 5 MG: 5 TABLET ORAL at 20:36

## 2024-08-12 RX ADMIN — METAXALONE 800 MG: 800 TABLET ORAL at 04:35

## 2024-08-12 RX ADMIN — ACETAMINOPHEN 1000 MG: 500 TABLET ORAL at 12:12

## 2024-08-12 RX ADMIN — GABAPENTIN 100 MG: 100 CAPSULE ORAL at 14:10

## 2024-08-12 RX ADMIN — ENOXAPARIN SODIUM 30 MG: 100 INJECTION SUBCUTANEOUS at 16:54

## 2024-08-12 ASSESSMENT — ENCOUNTER SYMPTOMS
MUSCULOSKELETAL NEGATIVE: 1
GASTROINTESTINAL NEGATIVE: 1
FOCAL WEAKNESS: 0
CARDIOVASCULAR NEGATIVE: 1
NEUROLOGICAL NEGATIVE: 1
BLURRED VISION: 0
EYES NEGATIVE: 1
PSYCHIATRIC NEGATIVE: 1
RESPIRATORY NEGATIVE: 1
DOUBLE VISION: 0
DIZZINESS: 0

## 2024-08-12 ASSESSMENT — PAIN DESCRIPTION - PAIN TYPE: TYPE: ACUTE PAIN
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
## 2024-08-12 NOTE — CARE PLAN
The patient is Watcher - Medium risk of patient condition declining or worsening    Shift Goals  Clinical Goals: rest, pain control, CT I&Os  Patient Goals: rest, pain control  Family Goals: update on POC    Progress made toward(s) clinical / shift goals:  Patient was able to rest intermittently overnight. Patient reported pain to be managed with PRN and scheduled medications. Patient reported nausea to be controlled with PRN medications. CT I&Os per flowsheets.     Patient is not progressing towards the following goals:

## 2024-08-12 NOTE — THERAPY
Occupational Therapy Contact Note    Patient Name: Kaylyn Peña  Age:  24 y.o., Sex:  female  Medical Record #: [id number]  Today's Date: 8/12/2024    OT order received. Per PT, RN and discussion with pt, pt currently has no acute OT needs. Education provided on role of OT and to request new order if needs arise. Will sign-off at this time.     GRACIELA Moulton, OTR/L

## 2024-08-12 NOTE — PROGRESS NOTES
"Assumed care of patient at 1845. Bedside report received. Assessment complete.  AA&Ox4. Denies CP/SOB.  Reporting 6/10 pain. Declined intervention at this time.  Educated patient regarding pharmacologic and non pharmacologic modalities for pain management.  Skin per flowsheet.  L CT set to -20 suction per MD order. Small air leak present.   Tolerating regular diet.   +N. -V. Medicated per MAR  + void. - BM. Last BM PTA  Pt ambulates independently and frequently.   All needs met at this time. Call light within reach. Pt calls appropriately. Bed low and locked, non skid socks in place. Hourly rounding in place.    /62   Pulse 74   Temp 36.7 °C (98.1 °F) (Temporal)   Resp 16   Ht 1.753 m (5' 9.02\")   Wt 56.7 kg (125 lb)   SpO2 98%   BMI 18.45 kg/m²     "

## 2024-08-12 NOTE — CARE PLAN
Problem: Hyperinflation  Goal: Prevent or improve atelectasis  Description: Target End Date:  3 to 4 days    1. Instruct incentive spirometry usage  2.  Perform hyperinflation therapy as indicated  Outcome: Progressing     PT is doing well today with PEP & IS.  PT is on RA and IS is 2500.

## 2024-08-12 NOTE — PROGRESS NOTES
Assumed care of patient at 1845  Bedside Report Received      Pt AO x 4  Vitals signs stable  Pt denies SOB  O2 sat >90% on RA breathing unlabored   Pulls > 2000 on the IS. Pt has good/strong effort.   Pt endorses 2/10 L chest/flank pain declines PRNs at this time   L anterior chest tube, dressing CDI. Chest tube water sealed this morning, could not see air leak, although present per night shift.    Lacerations to LUE. Dressings CDI, will be changed per orders   Pt denies N/V/D, although pt had some nausea overnight  Pt is tolerating regular diet  + voiding  + flatus, last BM PTA, Bowel sounds present   Pt ambulates self         Plan of care discussed with pt. Safety education done. Falls precautions in place. Call light and belongings within reach. All needs met at this time.
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
## 2024-08-13 ENCOUNTER — APPOINTMENT (OUTPATIENT)
Dept: RADIOLOGY | Facility: MEDICAL CENTER | Age: 24
DRG: 201 | End: 2024-08-13
Attending: NURSE PRACTITIONER
Payer: COMMERCIAL

## 2024-08-13 LAB
ANION GAP SERPL CALC-SCNC: 10 MMOL/L (ref 7–16)
BASOPHILS # BLD AUTO: 0.1 % (ref 0–1.8)
BASOPHILS # BLD: 0.01 K/UL (ref 0–0.12)
BUN SERPL-MCNC: 10 MG/DL (ref 8–22)
CALCIUM SERPL-MCNC: 8.7 MG/DL (ref 8.5–10.5)
CHLORIDE SERPL-SCNC: 102 MMOL/L (ref 96–112)
CO2 SERPL-SCNC: 23 MMOL/L (ref 20–33)
CREAT SERPL-MCNC: 0.65 MG/DL (ref 0.5–1.4)
EOSINOPHIL # BLD AUTO: 0.08 K/UL (ref 0–0.51)
EOSINOPHIL NFR BLD: 1.1 % (ref 0–6.9)
ERYTHROCYTE [DISTWIDTH] IN BLOOD BY AUTOMATED COUNT: 48.4 FL (ref 35.9–50)
GFR SERPLBLD CREATININE-BSD FMLA CKD-EPI: 127 ML/MIN/1.73 M 2
GLUCOSE SERPL-MCNC: 110 MG/DL (ref 65–99)
HCT VFR BLD AUTO: 39.4 % (ref 37–47)
HGB BLD-MCNC: 13.1 G/DL (ref 12–16)
IMM GRANULOCYTES # BLD AUTO: 0.04 K/UL (ref 0–0.11)
IMM GRANULOCYTES NFR BLD AUTO: 0.5 % (ref 0–0.9)
LYMPHOCYTES # BLD AUTO: 0.92 K/UL (ref 1–4.8)
LYMPHOCYTES NFR BLD: 12.5 % (ref 22–41)
MCH RBC QN AUTO: 31.3 PG (ref 27–33)
MCHC RBC AUTO-ENTMCNC: 33.2 G/DL (ref 32.2–35.5)
MCV RBC AUTO: 94 FL (ref 81.4–97.8)
MONOCYTES # BLD AUTO: 0.43 K/UL (ref 0–0.85)
MONOCYTES NFR BLD AUTO: 5.9 % (ref 0–13.4)
NEUTROPHILS # BLD AUTO: 5.86 K/UL (ref 1.82–7.42)
NEUTROPHILS NFR BLD: 79.9 % (ref 44–72)
NRBC # BLD AUTO: 0 K/UL
NRBC BLD-RTO: 0 /100 WBC (ref 0–0.2)
PLATELET # BLD AUTO: 216 K/UL (ref 164–446)
PMV BLD AUTO: 10.7 FL (ref 9–12.9)
POTASSIUM SERPL-SCNC: 4.2 MMOL/L (ref 3.6–5.5)
RBC # BLD AUTO: 4.19 M/UL (ref 4.2–5.4)
SODIUM SERPL-SCNC: 135 MMOL/L (ref 135–145)
WBC # BLD AUTO: 7.3 K/UL (ref 4.8–10.8)

## 2024-08-13 PROCEDURE — 36415 COLL VENOUS BLD VENIPUNCTURE: CPT

## 2024-08-13 PROCEDURE — 71045 X-RAY EXAM CHEST 1 VIEW: CPT

## 2024-08-13 PROCEDURE — 700101 HCHG RX REV CODE 250: Performed by: NURSE PRACTITIONER

## 2024-08-13 PROCEDURE — 700102 HCHG RX REV CODE 250 W/ 637 OVERRIDE(OP): Performed by: NURSE PRACTITIONER

## 2024-08-13 PROCEDURE — 80048 BASIC METABOLIC PNL TOTAL CA: CPT

## 2024-08-13 PROCEDURE — 700111 HCHG RX REV CODE 636 W/ 250 OVERRIDE (IP): Performed by: NURSE PRACTITIONER

## 2024-08-13 PROCEDURE — 85025 COMPLETE CBC W/AUTO DIFF WBC: CPT

## 2024-08-13 PROCEDURE — A9270 NON-COVERED ITEM OR SERVICE: HCPCS | Performed by: NURSE PRACTITIONER

## 2024-08-13 PROCEDURE — 94669 MECHANICAL CHEST WALL OSCILL: CPT

## 2024-08-13 PROCEDURE — 99232 SBSQ HOSP IP/OBS MODERATE 35: CPT | Performed by: REGISTERED NURSE

## 2024-08-13 PROCEDURE — 770001 HCHG ROOM/CARE - MED/SURG/GYN PRIV*

## 2024-08-13 RX ADMIN — GABAPENTIN 100 MG: 100 CAPSULE ORAL at 21:03

## 2024-08-13 RX ADMIN — DOCUSATE SODIUM 100 MG: 100 CAPSULE, LIQUID FILLED ORAL at 16:17

## 2024-08-13 RX ADMIN — CELECOXIB 200 MG: 200 CAPSULE ORAL at 05:22

## 2024-08-13 RX ADMIN — LIDOCAINE 1 PATCH: 4 PATCH TOPICAL at 19:30

## 2024-08-13 RX ADMIN — CELECOXIB 200 MG: 200 CAPSULE ORAL at 16:17

## 2024-08-13 RX ADMIN — ACETAMINOPHEN 1000 MG: 500 TABLET ORAL at 23:43

## 2024-08-13 RX ADMIN — OXYCODONE HYDROCHLORIDE 5 MG: 5 TABLET ORAL at 05:22

## 2024-08-13 RX ADMIN — ONDANSETRON 4 MG: 4 TABLET, ORALLY DISINTEGRATING ORAL at 16:16

## 2024-08-13 RX ADMIN — ENOXAPARIN SODIUM 30 MG: 100 INJECTION SUBCUTANEOUS at 05:22

## 2024-08-13 RX ADMIN — METAXALONE 800 MG: 800 TABLET ORAL at 11:37

## 2024-08-13 RX ADMIN — OXYCODONE HYDROCHLORIDE 5 MG: 5 TABLET ORAL at 16:17

## 2024-08-13 RX ADMIN — ACETAMINOPHEN 1000 MG: 500 TABLET ORAL at 11:37

## 2024-08-13 RX ADMIN — METAXALONE 800 MG: 800 TABLET ORAL at 05:22

## 2024-08-13 RX ADMIN — GABAPENTIN 100 MG: 100 CAPSULE ORAL at 05:22

## 2024-08-13 RX ADMIN — ONDANSETRON 4 MG: 4 TABLET, ORALLY DISINTEGRATING ORAL at 05:23

## 2024-08-13 RX ADMIN — METAXALONE 800 MG: 800 TABLET ORAL at 16:17

## 2024-08-13 RX ADMIN — PROCHLORPERAZINE EDISYLATE 5 MG: 5 INJECTION INTRAMUSCULAR; INTRAVENOUS at 09:15

## 2024-08-13 RX ADMIN — OXYCODONE HYDROCHLORIDE 5 MG: 5 TABLET ORAL at 19:33

## 2024-08-13 RX ADMIN — SENNOSIDES AND DOCUSATE SODIUM 1 TABLET: 50; 8.6 TABLET ORAL at 21:03

## 2024-08-13 RX ADMIN — ACETAMINOPHEN 1000 MG: 500 TABLET ORAL at 16:17

## 2024-08-13 RX ADMIN — OXYCODONE HYDROCHLORIDE 5 MG: 5 TABLET ORAL at 11:37

## 2024-08-13 RX ADMIN — ENOXAPARIN SODIUM 30 MG: 100 INJECTION SUBCUTANEOUS at 16:17

## 2024-08-13 ASSESSMENT — FIBROSIS 4 INDEX: FIB4 SCORE: 0.74

## 2024-08-13 ASSESSMENT — PAIN DESCRIPTION - PAIN TYPE
TYPE: ACUTE PAIN

## 2024-08-13 ASSESSMENT — ENCOUNTER SYMPTOMS
MUSCULOSKELETAL NEGATIVE: 1
NEUROLOGICAL NEGATIVE: 1
RESPIRATORY NEGATIVE: 1
CARDIOVASCULAR NEGATIVE: 1
BLURRED VISION: 0
FOCAL WEAKNESS: 0
EYES NEGATIVE: 1
PSYCHIATRIC NEGATIVE: 1
GASTROINTESTINAL NEGATIVE: 1
DIZZINESS: 0
DOUBLE VISION: 0

## 2024-08-13 NOTE — CARE PLAN
The patient is Stable - Low risk of patient condition declining or worsening    Shift Goals  Clinical Goals: pain mgmt  Patient Goals: pain mgmt  Family Goals: update on POC    Progress made toward(s) clinical / shift goals:    Problem: Pain - Standard  Goal: Alleviation of pain or a reduction in pain to the patient’s comfort goal  Outcome: Progressing   Perform frequent pain assessment, and medicate as needed per MAR.
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
## 2024-08-13 NOTE — CARE PLAN
The patient is Stable - Low risk of patient condition declining or worsening    Shift Goals  Clinical Goals: CT management, pain control  Patient Goals: pain control  Family Goals: not currently present    Progress made toward(s) clinical / shift goals:  Left chest tube remains in place and is water sealed. Patient is utilizing incentive spirometer frequently. Patient is ambulating frequently. Pain continues to be medicated per MAR with scheduled and PRN interventions.     Patient is not progressing towards the following goals: Pending CT removal and DC clearance.

## 2024-08-14 ENCOUNTER — APPOINTMENT (OUTPATIENT)
Dept: RADIOLOGY | Facility: MEDICAL CENTER | Age: 24
DRG: 201 | End: 2024-08-14
Attending: NURSE PRACTITIONER
Payer: COMMERCIAL

## 2024-08-14 LAB
ANION GAP SERPL CALC-SCNC: 9 MMOL/L (ref 7–16)
BASOPHILS # BLD AUTO: 0.3 % (ref 0–1.8)
BASOPHILS # BLD: 0.02 K/UL (ref 0–0.12)
BUN SERPL-MCNC: 9 MG/DL (ref 8–22)
CALCIUM SERPL-MCNC: 8.7 MG/DL (ref 8.5–10.5)
CHLORIDE SERPL-SCNC: 104 MMOL/L (ref 96–112)
CO2 SERPL-SCNC: 24 MMOL/L (ref 20–33)
CREAT SERPL-MCNC: 0.64 MG/DL (ref 0.5–1.4)
EOSINOPHIL # BLD AUTO: 0.17 K/UL (ref 0–0.51)
EOSINOPHIL NFR BLD: 2.7 % (ref 0–6.9)
ERYTHROCYTE [DISTWIDTH] IN BLOOD BY AUTOMATED COUNT: 49.6 FL (ref 35.9–50)
GFR SERPLBLD CREATININE-BSD FMLA CKD-EPI: 127 ML/MIN/1.73 M 2
GLUCOSE SERPL-MCNC: 100 MG/DL (ref 65–99)
HCT VFR BLD AUTO: 36.9 % (ref 37–47)
HGB BLD-MCNC: 12 G/DL (ref 12–16)
IMM GRANULOCYTES # BLD AUTO: 0.01 K/UL (ref 0–0.11)
IMM GRANULOCYTES NFR BLD AUTO: 0.2 % (ref 0–0.9)
LYMPHOCYTES # BLD AUTO: 1.67 K/UL (ref 1–4.8)
LYMPHOCYTES NFR BLD: 26.4 % (ref 22–41)
MCH RBC QN AUTO: 31.2 PG (ref 27–33)
MCHC RBC AUTO-ENTMCNC: 32.5 G/DL (ref 32.2–35.5)
MCV RBC AUTO: 95.8 FL (ref 81.4–97.8)
MONOCYTES # BLD AUTO: 0.33 K/UL (ref 0–0.85)
MONOCYTES NFR BLD AUTO: 5.2 % (ref 0–13.4)
NEUTROPHILS # BLD AUTO: 4.12 K/UL (ref 1.82–7.42)
NEUTROPHILS NFR BLD: 65.2 % (ref 44–72)
NRBC # BLD AUTO: 0 K/UL
NRBC BLD-RTO: 0 /100 WBC (ref 0–0.2)
PLATELET # BLD AUTO: 196 K/UL (ref 164–446)
PMV BLD AUTO: 10.9 FL (ref 9–12.9)
POTASSIUM SERPL-SCNC: 4.3 MMOL/L (ref 3.6–5.5)
RBC # BLD AUTO: 3.85 M/UL (ref 4.2–5.4)
SODIUM SERPL-SCNC: 137 MMOL/L (ref 135–145)
WBC # BLD AUTO: 6.3 K/UL (ref 4.8–10.8)

## 2024-08-14 PROCEDURE — 99232 SBSQ HOSP IP/OBS MODERATE 35: CPT | Performed by: REGISTERED NURSE

## 2024-08-14 PROCEDURE — 700102 HCHG RX REV CODE 250 W/ 637 OVERRIDE(OP): Performed by: NURSE PRACTITIONER

## 2024-08-14 PROCEDURE — 80048 BASIC METABOLIC PNL TOTAL CA: CPT

## 2024-08-14 PROCEDURE — 700111 HCHG RX REV CODE 636 W/ 250 OVERRIDE (IP): Mod: JZ | Performed by: NURSE PRACTITIONER

## 2024-08-14 PROCEDURE — 71045 X-RAY EXAM CHEST 1 VIEW: CPT

## 2024-08-14 PROCEDURE — 770001 HCHG ROOM/CARE - MED/SURG/GYN PRIV*

## 2024-08-14 PROCEDURE — 36415 COLL VENOUS BLD VENIPUNCTURE: CPT

## 2024-08-14 PROCEDURE — 94669 MECHANICAL CHEST WALL OSCILL: CPT

## 2024-08-14 PROCEDURE — 85025 COMPLETE CBC W/AUTO DIFF WBC: CPT

## 2024-08-14 PROCEDURE — A9270 NON-COVERED ITEM OR SERVICE: HCPCS | Performed by: NURSE PRACTITIONER

## 2024-08-14 PROCEDURE — 700101 HCHG RX REV CODE 250: Performed by: NURSE PRACTITIONER

## 2024-08-14 RX ADMIN — ACETAMINOPHEN 1000 MG: 500 TABLET ORAL at 05:03

## 2024-08-14 RX ADMIN — METAXALONE 800 MG: 800 TABLET ORAL at 05:03

## 2024-08-14 RX ADMIN — GABAPENTIN 100 MG: 100 CAPSULE ORAL at 13:31

## 2024-08-14 RX ADMIN — ACETAMINOPHEN 1000 MG: 500 TABLET ORAL at 13:31

## 2024-08-14 RX ADMIN — ACETAMINOPHEN 1000 MG: 500 TABLET ORAL at 21:43

## 2024-08-14 RX ADMIN — DOCUSATE SODIUM 100 MG: 100 CAPSULE, LIQUID FILLED ORAL at 16:23

## 2024-08-14 RX ADMIN — LIDOCAINE 1 PATCH: 4 PATCH TOPICAL at 21:42

## 2024-08-14 RX ADMIN — CELECOXIB 200 MG: 200 CAPSULE ORAL at 16:23

## 2024-08-14 RX ADMIN — OXYCODONE HYDROCHLORIDE 5 MG: 5 TABLET ORAL at 16:21

## 2024-08-14 RX ADMIN — ENOXAPARIN SODIUM 30 MG: 100 INJECTION SUBCUTANEOUS at 05:02

## 2024-08-14 RX ADMIN — GABAPENTIN 100 MG: 100 CAPSULE ORAL at 05:03

## 2024-08-14 RX ADMIN — DOCUSATE SODIUM 100 MG: 100 CAPSULE, LIQUID FILLED ORAL at 05:03

## 2024-08-14 RX ADMIN — ONDANSETRON 4 MG: 2 INJECTION INTRAMUSCULAR; INTRAVENOUS at 05:03

## 2024-08-14 RX ADMIN — SENNOSIDES AND DOCUSATE SODIUM 1 TABLET: 50; 8.6 TABLET ORAL at 21:43

## 2024-08-14 RX ADMIN — CELECOXIB 200 MG: 200 CAPSULE ORAL at 05:03

## 2024-08-14 RX ADMIN — GABAPENTIN 100 MG: 100 CAPSULE ORAL at 21:43

## 2024-08-14 RX ADMIN — ACETAMINOPHEN 1000 MG: 500 TABLET ORAL at 23:24

## 2024-08-14 RX ADMIN — METAXALONE 800 MG: 800 TABLET ORAL at 16:23

## 2024-08-14 ASSESSMENT — ENCOUNTER SYMPTOMS
NEUROLOGICAL NEGATIVE: 1
CARDIOVASCULAR NEGATIVE: 1
GASTROINTESTINAL NEGATIVE: 1
FOCAL WEAKNESS: 0
DIZZINESS: 0
BLURRED VISION: 0
PSYCHIATRIC NEGATIVE: 1
EYES NEGATIVE: 1
RESPIRATORY NEGATIVE: 1
DOUBLE VISION: 0
MUSCULOSKELETAL NEGATIVE: 1

## 2024-08-14 ASSESSMENT — PAIN DESCRIPTION - PAIN TYPE
TYPE: ACUTE PAIN

## 2024-08-14 NOTE — CARE PLAN
The patient is Stable - Low risk of patient condition declining or worsening    Shift Goals  Clinical Goals: Ambulation/ CT mgt  Patient Goals: Ambulation/ CT mgt  Family Goals: not currently present    Progress made toward(s) clinical / shift goals:        Problem: Pain - Standard  Goal: Alleviation of pain or a reduction in pain to the patient’s comfort goal  Description: Target End Date:  Prior to discharge or change in level of care    Document on Vitals flowsheet    1.  Document pain using the appropriate pain scale per order or unit policy  2.  Educate and implement non-pharmacologic comfort measures (i.e. relaxation, distraction, massage, cold/heat therapy, etc.)  3.  Pain management medications as ordered  4.  Reassess pain after pain med administration per policy  5.  If opiods administered assess patient's response to pain medication is appropriate per POSS sedation scale  6.  Follow pain management plan developed in collaboration with patient and interdisciplinary team (including palliative care or pain specialists if applicable)  Outcome: Progressing     Problem: Knowledge Deficit - Standard  Goal: Patient and family/care givers will demonstrate understanding of plan of care, disease process/condition, diagnostic tests and medications  Description: Target End Date:  1-3 days or as soon as patient condition allows    Document in Patient Education    1.  Patient and family/caregiver oriented to unit, equipment, visitation policy and means for communicating concern  2.  Complete/review Learning Assessment  3.  Assess knowledge level of disease process/condition, treatment plan, diagnostic tests and medications  4.  Explain disease process/condition, treatment plan, diagnostic tests and medications  Outcome: Progressing

## 2024-08-14 NOTE — PROGRESS NOTES
Bedside report received, assessment completed    A&O x  4, pt calls appropriately  Mobility: Up self, Assistive Devices: no assistive devices needed   6 Clicks:  Mobility/ PT not indicated   ADL/ OT not indicated   Fall Risk Assessment: 5, n/a   Fall Precautions Include: + Personal Items at bedside, + Bed in low position, + Door Notifications in place   Pain Assessment / Reassessment completed, medication provided per MAR  Diet: Regular   - Tolerating  LDA:   IV Access: 18g RAC, CDI/ flushed/ SL/ Infusing per MAR   CT: Righ side to water seal   GI/: restroom void, + flatus,  BM    - Bowel protocols in place: Rx, PO intake and fluids encouraged, pt up to ambulate unit x3qd  DVT Prophylaxis: SCD on while in bed    Homer Score: 22, Interventions per flow sheet   Skin Assessment: chest tube site CDI   Procedures:    - CT placement   D/C Plan:    - Pulmonary Hygiene   - CT mgt/ CXR tomorrow, pending resolution of right pneumo  - Pain Mgt  - Home no additional needs         Reviewed plan of care with patient, bed in lowest position and locked, pt resting comfortably now, call light within reach, all needs met at this time. Interventions will be executed per plan of care
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
## 2024-08-14 NOTE — CARE PLAN
Problem: Pain - Standard  Goal: Alleviation of pain or a reduction in pain to the patient’s comfort goal  Description: Target End Date:  Prior to discharge or change in level of care    Document on Vitals flowsheet    1.  Document pain using the appropriate pain scale per order or unit policy  2.  Educate and implement non-pharmacologic comfort measures (i.e. relaxation, distraction, massage, cold/heat therapy, etc.)  3.  Pain management medications as ordered  4.  Reassess pain after pain med administration per policy  5.  If opiods administered assess patient's response to pain medication is appropriate per POSS sedation scale  6.  Follow pain management plan developed in collaboration with patient and interdisciplinary team (including palliative care or pain specialists if applicable)  Outcome: Progressing   The patient is Stable - Low risk of patient condition declining or worsening    Shift Goals  Clinical Goals: Ct mngt, pain control, rest  Patient Goals: pain control, rest  Family Goals: not currently present    Progress made toward(s) clinical / shift goals:  prn pain meds and scheduled pain meds educated to the pt & verbalized understanding.

## 2024-08-14 NOTE — CARE PLAN
The patient is Stable - Low risk of patient condition declining or worsening    Shift Goals  Clinical Goals: Ambulation/ CT mgt  Patient Goals: Ambulation/ CT mgt  Family Goals: not currently present    Progress made toward(s) clinical / shift goals:        Problem: Pain - Standard  Goal: Alleviation of pain or a reduction in pain to the patient’s comfort goal  Description: Target End Date:  Prior to discharge or change in level of care    Document on Vitals flowsheet    1.  Document pain using the appropriate pain scale per order or unit policy  2.  Educate and implement non-pharmacologic comfort measures (i.e. relaxation, distraction, massage, cold/heat therapy, etc.)  3.  Pain management medications as ordered  4.  Reassess pain after pain med administration per policy  5.  If opiods administered assess patient's response to pain medication is appropriate per POSS sedation scale  6.  Follow pain management plan developed in collaboration with patient and interdisciplinary team (including palliative care or pain specialists if applicable)  8/14/2024 1111 by Joan Vieira RDEMETRIO.  Outcome: Progressing  8/14/2024 0954 by Joan Vieira R.N.  Outcome: Progressing     Problem: Knowledge Deficit - Standard  Goal: Patient and family/care givers will demonstrate understanding of plan of care, disease process/condition, diagnostic tests and medications  Description: Target End Date:  1-3 days or as soon as patient condition allows    Document in Patient Education    1.  Patient and family/caregiver oriented to unit, equipment, visitation policy and means for communicating concern  2.  Complete/review Learning Assessment  3.  Assess knowledge level of disease process/condition, treatment plan, diagnostic tests and medications  4.  Explain disease process/condition, treatment plan, diagnostic tests and medications  8/14/2024 1111 by Joan Vieira, R.N.  Outcome: Progressing  8/14/2024  0954 by Joan Vieira R.N.  Outcome: Progressing

## 2024-08-15 ENCOUNTER — APPOINTMENT (OUTPATIENT)
Dept: RADIOLOGY | Facility: MEDICAL CENTER | Age: 24
DRG: 201 | End: 2024-08-15
Attending: NURSE PRACTITIONER
Payer: COMMERCIAL

## 2024-08-15 PROBLEM — J93.9 BILATERAL PNEUMOTHORACES: Status: ACTIVE | Noted: 2024-08-10

## 2024-08-15 LAB
ANION GAP SERPL CALC-SCNC: 8 MMOL/L (ref 7–16)
BASOPHILS # BLD AUTO: 0.2 % (ref 0–1.8)
BASOPHILS # BLD: 0.01 K/UL (ref 0–0.12)
BUN SERPL-MCNC: 11 MG/DL (ref 8–22)
CALCIUM SERPL-MCNC: 8.5 MG/DL (ref 8.5–10.5)
CHLORIDE SERPL-SCNC: 106 MMOL/L (ref 96–112)
CO2 SERPL-SCNC: 23 MMOL/L (ref 20–33)
CREAT SERPL-MCNC: 0.54 MG/DL (ref 0.5–1.4)
EOSINOPHIL # BLD AUTO: 0.15 K/UL (ref 0–0.51)
EOSINOPHIL NFR BLD: 3 % (ref 0–6.9)
ERYTHROCYTE [DISTWIDTH] IN BLOOD BY AUTOMATED COUNT: 49.7 FL (ref 35.9–50)
GFR SERPLBLD CREATININE-BSD FMLA CKD-EPI: 132 ML/MIN/1.73 M 2
GLUCOSE SERPL-MCNC: 87 MG/DL (ref 65–99)
HCT VFR BLD AUTO: 35.4 % (ref 37–47)
HGB BLD-MCNC: 11.6 G/DL (ref 12–16)
IMM GRANULOCYTES # BLD AUTO: 0.01 K/UL (ref 0–0.11)
IMM GRANULOCYTES NFR BLD AUTO: 0.2 % (ref 0–0.9)
LYMPHOCYTES # BLD AUTO: 1.55 K/UL (ref 1–4.8)
LYMPHOCYTES NFR BLD: 30.8 % (ref 22–41)
MAGNESIUM SERPL-MCNC: 1.7 MG/DL (ref 1.5–2.5)
MCH RBC QN AUTO: 30.8 PG (ref 27–33)
MCHC RBC AUTO-ENTMCNC: 32.8 G/DL (ref 32.2–35.5)
MCV RBC AUTO: 93.9 FL (ref 81.4–97.8)
MONOCYTES # BLD AUTO: 0.32 K/UL (ref 0–0.85)
MONOCYTES NFR BLD AUTO: 6.4 % (ref 0–13.4)
NEUTROPHILS # BLD AUTO: 2.99 K/UL (ref 1.82–7.42)
NEUTROPHILS NFR BLD: 59.4 % (ref 44–72)
NRBC # BLD AUTO: 0 K/UL
NRBC BLD-RTO: 0 /100 WBC (ref 0–0.2)
PHOSPHATE SERPL-MCNC: 3.5 MG/DL (ref 2.5–4.5)
PLATELET # BLD AUTO: 210 K/UL (ref 164–446)
PMV BLD AUTO: 10.8 FL (ref 9–12.9)
POTASSIUM SERPL-SCNC: 4 MMOL/L (ref 3.6–5.5)
RBC # BLD AUTO: 3.77 M/UL (ref 4.2–5.4)
SODIUM SERPL-SCNC: 137 MMOL/L (ref 135–145)
WBC # BLD AUTO: 5 K/UL (ref 4.8–10.8)

## 2024-08-15 PROCEDURE — 36415 COLL VENOUS BLD VENIPUNCTURE: CPT

## 2024-08-15 PROCEDURE — 80048 BASIC METABOLIC PNL TOTAL CA: CPT

## 2024-08-15 PROCEDURE — 83735 ASSAY OF MAGNESIUM: CPT

## 2024-08-15 PROCEDURE — 770001 HCHG ROOM/CARE - MED/SURG/GYN PRIV*

## 2024-08-15 PROCEDURE — 99232 SBSQ HOSP IP/OBS MODERATE 35: CPT | Performed by: REGISTERED NURSE

## 2024-08-15 PROCEDURE — 84100 ASSAY OF PHOSPHORUS: CPT

## 2024-08-15 PROCEDURE — 700111 HCHG RX REV CODE 636 W/ 250 OVERRIDE (IP): Performed by: NURSE PRACTITIONER

## 2024-08-15 PROCEDURE — 71045 X-RAY EXAM CHEST 1 VIEW: CPT

## 2024-08-15 PROCEDURE — 700102 HCHG RX REV CODE 250 W/ 637 OVERRIDE(OP): Performed by: NURSE PRACTITIONER

## 2024-08-15 PROCEDURE — 700101 HCHG RX REV CODE 250: Performed by: NURSE PRACTITIONER

## 2024-08-15 PROCEDURE — A9270 NON-COVERED ITEM OR SERVICE: HCPCS | Performed by: NURSE PRACTITIONER

## 2024-08-15 PROCEDURE — 85025 COMPLETE CBC W/AUTO DIFF WBC: CPT

## 2024-08-15 RX ADMIN — OXYCODONE HYDROCHLORIDE 5 MG: 5 TABLET ORAL at 08:54

## 2024-08-15 RX ADMIN — OXYCODONE HYDROCHLORIDE 10 MG: 10 TABLET ORAL at 03:55

## 2024-08-15 RX ADMIN — GABAPENTIN 100 MG: 100 CAPSULE ORAL at 12:54

## 2024-08-15 RX ADMIN — METAXALONE 800 MG: 800 TABLET ORAL at 16:28

## 2024-08-15 RX ADMIN — LIDOCAINE 1 PATCH: 4 PATCH TOPICAL at 19:43

## 2024-08-15 RX ADMIN — ENOXAPARIN SODIUM 30 MG: 100 INJECTION SUBCUTANEOUS at 05:13

## 2024-08-15 RX ADMIN — DOCUSATE SODIUM 100 MG: 100 CAPSULE, LIQUID FILLED ORAL at 16:28

## 2024-08-15 RX ADMIN — GABAPENTIN 100 MG: 100 CAPSULE ORAL at 21:36

## 2024-08-15 RX ADMIN — ACETAMINOPHEN 1000 MG: 500 TABLET ORAL at 05:14

## 2024-08-15 RX ADMIN — OXYCODONE HYDROCHLORIDE 10 MG: 10 TABLET ORAL at 19:43

## 2024-08-15 RX ADMIN — METAXALONE 800 MG: 800 TABLET ORAL at 12:54

## 2024-08-15 RX ADMIN — METAXALONE 800 MG: 800 TABLET ORAL at 05:14

## 2024-08-15 RX ADMIN — ONDANSETRON 4 MG: 2 INJECTION INTRAMUSCULAR; INTRAVENOUS at 05:11

## 2024-08-15 RX ADMIN — DOCUSATE SODIUM 100 MG: 100 CAPSULE, LIQUID FILLED ORAL at 05:14

## 2024-08-15 RX ADMIN — CELECOXIB 200 MG: 200 CAPSULE ORAL at 05:14

## 2024-08-15 RX ADMIN — GABAPENTIN 100 MG: 100 CAPSULE ORAL at 05:14

## 2024-08-15 RX ADMIN — ACETAMINOPHEN 1000 MG: 500 TABLET ORAL at 12:54

## 2024-08-15 ASSESSMENT — ENCOUNTER SYMPTOMS
MUSCULOSKELETAL NEGATIVE: 1
CARDIOVASCULAR NEGATIVE: 1
BLURRED VISION: 0
NEUROLOGICAL NEGATIVE: 1
PSYCHIATRIC NEGATIVE: 1
EYES NEGATIVE: 1
GASTROINTESTINAL NEGATIVE: 1
DOUBLE VISION: 0
RESPIRATORY NEGATIVE: 1
DIZZINESS: 0
FOCAL WEAKNESS: 0

## 2024-08-15 ASSESSMENT — PAIN DESCRIPTION - PAIN TYPE
TYPE: ACUTE PAIN

## 2024-08-15 NOTE — PROGRESS NOTES
Received report from previous shift RN  Assessment complete.  A&O x 4. Patient calls appropriately.  Patient ambulates up self. Bed alarm off.   Patient has 5/10 pain. Pain managed with prescribed medications.  Denies N&V. Tolerating regular diet.  Surgical L chest tube to water seal.  + void, + flatus, + BM.  Patient denies SOB.  SCD's refused, patient ambulating.  Review plan with of care with patient. Call light and personal belongings with in reach. Hourly rounding in place. All needs met at this time.

## 2024-08-15 NOTE — CARE PLAN
The patient is Stable - Low risk of patient condition declining or worsening    Shift Goals: Chest tube/pulm hygiene, safety  Clinical Goals: Chest Tube, Pulmonary Hygiene, Safety  Patient Goals: Shower, Rest, Go Home  Family Goals: SONNY    Progress made toward(s) clinical / shift goals:  Pt resting comfortably in bed, pain well controlled with current medication.  This RN discussed POC and BTD with Pt, who verbalized understanding.

## 2024-08-15 NOTE — PROGRESS NOTES
Assumed care of patient at 1845. Bedside report received from Joan Gonzales. Assessment complete.  AA&Ox4. Denies CP/SOB.  Reporting 5/10 pain.  Declined intervention at this time.  Educated patient regarding pharmacologic and non pharmacologic modalities for pain management.  Skin per flowsheets  Tolerating Regular diet. Denies N/V.  + void. + BM. Last BM 8/14  Pt ambulates by self  All needs met at this time. Call light within reach. Pt calls appropriately. Bed low and locked, non skid socks in place. Hourly rounding in place.
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
## 2024-08-15 NOTE — CARE PLAN
The patient is Stable - Low risk of patient condition declining or worsening    Shift Goals  Clinical Goals: chest tube monitoring, pain control, skin care, safety  Patient Goals: pain control, updates  Family Goals: SONNY    Progress made toward(s) clinical / shift goals:      Patient's pain managed with the use of scheduled and PRN medications per the MAR. Chest tube site and output frequently monitored every 4 hours.       Problem: Pain - Standard  Goal: Alleviation of pain or a reduction in pain to the patient’s comfort goal  Description: Target End Date:  Prior to discharge or change in level of care    Document on Vitals flowsheet    1.  Document pain using the appropriate pain scale per order or unit policy  2.  Educate and implement non-pharmacologic comfort measures (i.e. relaxation, distraction, massage, cold/heat therapy, etc.)  3.  Pain management medications as ordered  4.  Reassess pain after pain med administration per policy  5.  If opiods administered assess patient's response to pain medication is appropriate per POSS sedation scale  6.  Follow pain management plan developed in collaboration with patient and interdisciplinary team (including palliative care or pain specialists if applicable)  Outcome: Progressing     Problem: Knowledge Deficit - Standard  Goal: Patient and family/care givers will demonstrate understanding of plan of care, disease process/condition, diagnostic tests and medications  Description: Target End Date:  1-3 days or as soon as patient condition allows    Document in Patient Education    1.  Patient and family/caregiver oriented to unit, equipment, visitation policy and means for communicating concern  2.  Complete/review Learning Assessment  3.  Assess knowledge level of disease process/condition, treatment plan, diagnostic tests and medications  4.  Explain disease process/condition, treatment plan, diagnostic tests and medications  Outcome: Progressing

## 2024-08-16 ENCOUNTER — APPOINTMENT (OUTPATIENT)
Dept: RADIOLOGY | Facility: MEDICAL CENTER | Age: 24
DRG: 201 | End: 2024-08-16
Attending: NURSE PRACTITIONER
Payer: COMMERCIAL

## 2024-08-16 ENCOUNTER — APPOINTMENT (OUTPATIENT)
Dept: RADIOLOGY | Facility: MEDICAL CENTER | Age: 24
DRG: 201 | End: 2024-08-16
Attending: REGISTERED NURSE
Payer: COMMERCIAL

## 2024-08-16 ENCOUNTER — PHARMACY VISIT (OUTPATIENT)
Dept: PHARMACY | Facility: MEDICAL CENTER | Age: 24
End: 2024-08-16
Payer: COMMERCIAL

## 2024-08-16 VITALS
TEMPERATURE: 98.2 F | RESPIRATION RATE: 18 BRPM | HEIGHT: 69 IN | DIASTOLIC BLOOD PRESSURE: 67 MMHG | HEART RATE: 62 BPM | OXYGEN SATURATION: 99 % | BODY MASS INDEX: 20.05 KG/M2 | SYSTOLIC BLOOD PRESSURE: 114 MMHG | WEIGHT: 135.36 LBS

## 2024-08-16 LAB
ANION GAP SERPL CALC-SCNC: 9 MMOL/L (ref 7–16)
BASOPHILS # BLD AUTO: 0.3 % (ref 0–1.8)
BASOPHILS # BLD: 0.02 K/UL (ref 0–0.12)
BUN SERPL-MCNC: 8 MG/DL (ref 8–22)
CALCIUM SERPL-MCNC: 8.9 MG/DL (ref 8.5–10.5)
CHLORIDE SERPL-SCNC: 107 MMOL/L (ref 96–112)
CO2 SERPL-SCNC: 24 MMOL/L (ref 20–33)
CREAT SERPL-MCNC: 0.54 MG/DL (ref 0.5–1.4)
EOSINOPHIL # BLD AUTO: 0.15 K/UL (ref 0–0.51)
EOSINOPHIL NFR BLD: 2.5 % (ref 0–6.9)
ERYTHROCYTE [DISTWIDTH] IN BLOOD BY AUTOMATED COUNT: 49.2 FL (ref 35.9–50)
GFR SERPLBLD CREATININE-BSD FMLA CKD-EPI: 132 ML/MIN/1.73 M 2
GLUCOSE SERPL-MCNC: 108 MG/DL (ref 65–99)
HCT VFR BLD AUTO: 36.5 % (ref 37–47)
HGB BLD-MCNC: 12 G/DL (ref 12–16)
IMM GRANULOCYTES # BLD AUTO: 0.01 K/UL (ref 0–0.11)
IMM GRANULOCYTES NFR BLD AUTO: 0.2 % (ref 0–0.9)
LYMPHOCYTES # BLD AUTO: 1.61 K/UL (ref 1–4.8)
LYMPHOCYTES NFR BLD: 26.8 % (ref 22–41)
MCH RBC QN AUTO: 31 PG (ref 27–33)
MCHC RBC AUTO-ENTMCNC: 32.9 G/DL (ref 32.2–35.5)
MCV RBC AUTO: 94.3 FL (ref 81.4–97.8)
MONOCYTES # BLD AUTO: 0.37 K/UL (ref 0–0.85)
MONOCYTES NFR BLD AUTO: 6.2 % (ref 0–13.4)
NEUTROPHILS # BLD AUTO: 3.84 K/UL (ref 1.82–7.42)
NEUTROPHILS NFR BLD: 64 % (ref 44–72)
NRBC # BLD AUTO: 0 K/UL
NRBC BLD-RTO: 0 /100 WBC (ref 0–0.2)
PLATELET # BLD AUTO: 227 K/UL (ref 164–446)
PMV BLD AUTO: 10.2 FL (ref 9–12.9)
POTASSIUM SERPL-SCNC: 3.7 MMOL/L (ref 3.6–5.5)
RBC # BLD AUTO: 3.87 M/UL (ref 4.2–5.4)
SODIUM SERPL-SCNC: 140 MMOL/L (ref 135–145)
WBC # BLD AUTO: 6 K/UL (ref 4.8–10.8)

## 2024-08-16 PROCEDURE — 80048 BASIC METABOLIC PNL TOTAL CA: CPT

## 2024-08-16 PROCEDURE — RXMED WILLOW AMBULATORY MEDICATION CHARGE: Performed by: REGISTERED NURSE

## 2024-08-16 PROCEDURE — A9270 NON-COVERED ITEM OR SERVICE: HCPCS | Performed by: NURSE PRACTITIONER

## 2024-08-16 PROCEDURE — 700102 HCHG RX REV CODE 250 W/ 637 OVERRIDE(OP): Performed by: NURSE PRACTITIONER

## 2024-08-16 PROCEDURE — 71045 X-RAY EXAM CHEST 1 VIEW: CPT

## 2024-08-16 PROCEDURE — 85025 COMPLETE CBC W/AUTO DIFF WBC: CPT

## 2024-08-16 PROCEDURE — 99239 HOSP IP/OBS DSCHRG MGMT >30: CPT | Performed by: REGISTERED NURSE

## 2024-08-16 PROCEDURE — 71046 X-RAY EXAM CHEST 2 VIEWS: CPT

## 2024-08-16 RX ORDER — OXYCODONE HYDROCHLORIDE 5 MG/1
5 TABLET ORAL EVERY 4 HOURS PRN
Qty: 8 TABLET | Refills: 0 | Status: SHIPPED | OUTPATIENT
Start: 2024-08-16 | End: 2024-08-23

## 2024-08-16 RX ORDER — ACETAMINOPHEN 500 MG
1000 TABLET ORAL EVERY 6 HOURS PRN
COMMUNITY
Start: 2024-08-16

## 2024-08-16 RX ORDER — METAXALONE 800 MG/1
800 TABLET ORAL 3 TIMES DAILY
Qty: 21 TABLET | Refills: 0 | Status: SHIPPED | OUTPATIENT
Start: 2024-08-16 | End: 2024-08-23

## 2024-08-16 RX ADMIN — METAXALONE 800 MG: 800 TABLET ORAL at 16:50

## 2024-08-16 RX ADMIN — GABAPENTIN 100 MG: 100 CAPSULE ORAL at 14:36

## 2024-08-16 RX ADMIN — METAXALONE 800 MG: 800 TABLET ORAL at 11:45

## 2024-08-16 RX ADMIN — METAXALONE 800 MG: 800 TABLET ORAL at 04:51

## 2024-08-16 RX ADMIN — GABAPENTIN 100 MG: 100 CAPSULE ORAL at 04:51

## 2024-08-16 RX ADMIN — OXYCODONE HYDROCHLORIDE 10 MG: 10 TABLET ORAL at 01:41

## 2024-08-16 RX ADMIN — ACETAMINOPHEN 1000 MG: 500 TABLET ORAL at 08:42

## 2024-08-16 ASSESSMENT — ENCOUNTER SYMPTOMS
FOCAL WEAKNESS: 0
MUSCULOSKELETAL NEGATIVE: 1
CARDIOVASCULAR NEGATIVE: 1
NEUROLOGICAL NEGATIVE: 1
DOUBLE VISION: 0
EYES NEGATIVE: 1
BLURRED VISION: 0
GASTROINTESTINAL NEGATIVE: 1
RESPIRATORY NEGATIVE: 1
DIZZINESS: 0
PSYCHIATRIC NEGATIVE: 1

## 2024-08-16 ASSESSMENT — PAIN DESCRIPTION - PAIN TYPE: TYPE: ACUTE PAIN
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
## 2024-08-16 NOTE — CARE PLAN
The patient is Stable - Low risk of patient condition declining or worsening    Shift Goals  Clinical Goals: pain mgmt  Patient Goals: pain mgmt  Family Goals: SONNY    Progress made toward(s) clinical / shift goals:    Problem: Pain - Standard  Goal: Alleviation of pain or a reduction in pain to the patient’s comfort goal  Outcome: Progressing   Perform frequent pain assessment and medicate as needed per MAR. Patient utilizing heat packs as well as PRN oral oxy to manage pain.
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
## 2024-08-16 NOTE — PROGRESS NOTES
Assumed care of patient at 1845  Bedside Report Received      Pt AO x 4  Vitals signs stable  Pt denies SOB  O2 sat >90% on RA breathing unlabored   Pulls > 2000 on the IS. Pt has good/strong effort.   Pt endorses 2/10 L chest/flank pain declines PRNs at this time   L chest tube removed by trauma, pt denies SOB.   Lacerations to LUE. Dressings CDI, dressing changed per orders   Pt denies N/V/D, although pt had some nausea overnight  Pt is tolerating regular diet  + voiding  + flatus, last BM PTA, Bowel sounds present   Pt ambulates self         Plan of care discussed with pt. Safety education done. Falls precautions in place. Call light and belongings within reach. All needs met at this time.

## 2024-08-16 NOTE — DISCHARGE PLANNING
Case Management Discharge Planning    Admission Date: 8/10/2024  GMLOS: 2.4  ALOS: 6    6-Clicks ADL Score: 24  6-Clicks Mobility Score: 24      Anticipated Discharge Dispo: Discharge Disposition: Discharged to home/self care (01)  Discharge Address: [address]  Discharge Contact Phone Number: [phone]    DME Needed: No    Action(s) Taken: Updated Provider/Nurse on Discharge Plan  RNCM discussed pt during IDT rounds. Pt has adequate support at home, however, possible difficulty in transportation for medical needs and to obtain meds upon discharge was reported. Meds to beds will be arranged for pt to assist.    Escalations Completed: Provider and Bedside RN    Medically Clear: No    Next Steps: Follow-up with medical team to discuss DC needs and barriers.    Barriers to Discharge: Medical clearance    Care Transition Team Assessment    Information Source  Orientation Level: Oriented X4  Information Given By:  (chart review)    Readmission Evaluation  Is this a readmission?: No    Elopement Risk  Legal Hold: No  Ambulatory or Self Mobile in Wheelchair: Yes  Disoriented: No  Psychiatric Symptoms: None  History of Wandering: No  Elopement this Admit: No  Vocalizing Wanting to Leave: No  Displays Behaviors, Body Language Wanting to Leave: No-Not at Risk for Elopement  Elopement Risk: Not at Risk for Elopement    Interdisciplinary Discharge Planning  Lives with - Patient's Self Care Capacity: Child Less than 18 Years of Age, Parents  Patient or legal guardian wants to designate a caregiver: No  Support Systems: Family Member(s), Friends / Neighbors  Housing / Facility: Mobile Home  Prior Services: Home-Independent    Discharge Preparedness  What is your plan after discharge?: Home with help  Prior Functional Level: Independent with Medication Management, Independent with Activities of Daily Living  Difficulity with ADLs: None  Difficulity with IADLs: None    Functional Assesment  Prior Functional  Level: Independent with Medication Management, Independent with Activities of Daily Living    Finances  Financial Barriers to Discharge: Yes  Prescription Coverage: Yes    Vision / Hearing Impairment  Vision Impairment : Yes  Hearing Impairment : Yes      Advance Directive  Advance Directive?: None    Domestic Abuse  Have you ever been the victim of abuse or violence?: No  Possible Abuse/Neglect Reported to:: Not Applicable    Psychological Assessment  History of Substance Abuse: None  History of Psychiatric Problems: No  Non-compliant with Treatment: No  Newly Diagnosed Illness: No    Discharge Risks or Barriers  Discharge risks or barriers?: Transportation  Patient risk factors: Lack of outside supports    Anticipated Discharge Information  Discharge Disposition: Discharged to home/self care (01)  Discharge Address: [address] [place] [address]  Discharge Contact Phone Number: [phone]

## 2024-08-17 NOTE — PROGRESS NOTES
Discharge instructions and controlled substance consent reviewed and signed by patient. No further questions at this time. IV DC'd by RN. Medications delivered to patient in the discharge lounge. Patient left with all belongings by car with friend/family member in stable condition.

## 2024-08-24 ENCOUNTER — APPOINTMENT (OUTPATIENT)
Dept: RADIOLOGY | Facility: MEDICAL CENTER | Age: 24
End: 2024-08-24
Attending: EMERGENCY MEDICINE
Payer: COMMERCIAL

## 2024-08-24 ENCOUNTER — HOSPITAL ENCOUNTER (EMERGENCY)
Facility: MEDICAL CENTER | Age: 24
End: 2024-08-24
Attending: EMERGENCY MEDICINE
Payer: COMMERCIAL

## 2024-08-24 ENCOUNTER — PHARMACY VISIT (OUTPATIENT)
Dept: PHARMACY | Facility: MEDICAL CENTER | Age: 24
End: 2024-08-24
Payer: COMMERCIAL

## 2024-08-24 VITALS
BODY MASS INDEX: 20.05 KG/M2 | TEMPERATURE: 97.2 F | SYSTOLIC BLOOD PRESSURE: 120 MMHG | HEIGHT: 69 IN | WEIGHT: 135.36 LBS | DIASTOLIC BLOOD PRESSURE: 61 MMHG | RESPIRATION RATE: 16 BRPM | OXYGEN SATURATION: 97 % | HEART RATE: 93 BPM

## 2024-08-24 DIAGNOSIS — R07.89 CHEST WALL PAIN: ICD-10-CM

## 2024-08-24 LAB
ALBUMIN SERPL BCP-MCNC: 4.1 G/DL (ref 3.2–4.9)
ALBUMIN/GLOB SERPL: 1.5 G/DL
ALP SERPL-CCNC: 88 U/L (ref 30–99)
ALT SERPL-CCNC: 12 U/L (ref 2–50)
ANION GAP SERPL CALC-SCNC: 13 MMOL/L (ref 7–16)
AST SERPL-CCNC: 18 U/L (ref 12–45)
B-HCG SERPL-ACNC: <1 MIU/ML (ref 0–5)
BASOPHILS # BLD AUTO: 0.7 % (ref 0–1.8)
BASOPHILS # BLD: 0.05 K/UL (ref 0–0.12)
BILIRUB SERPL-MCNC: 0.3 MG/DL (ref 0.1–1.5)
BUN SERPL-MCNC: 6 MG/DL (ref 8–22)
CALCIUM ALBUM COR SERPL-MCNC: 9.2 MG/DL (ref 8.5–10.5)
CALCIUM SERPL-MCNC: 9.3 MG/DL (ref 8.5–10.5)
CHLORIDE SERPL-SCNC: 107 MMOL/L (ref 96–112)
CO2 SERPL-SCNC: 24 MMOL/L (ref 20–33)
CREAT SERPL-MCNC: 0.53 MG/DL (ref 0.5–1.4)
EKG IMPRESSION: NORMAL
EOSINOPHIL # BLD AUTO: 0.11 K/UL (ref 0–0.51)
EOSINOPHIL NFR BLD: 1.6 % (ref 0–6.9)
ERYTHROCYTE [DISTWIDTH] IN BLOOD BY AUTOMATED COUNT: 49.5 FL (ref 35.9–50)
GFR SERPLBLD CREATININE-BSD FMLA CKD-EPI: 133 ML/MIN/1.73 M 2
GLOBULIN SER CALC-MCNC: 2.7 G/DL (ref 1.9–3.5)
GLUCOSE SERPL-MCNC: 95 MG/DL (ref 65–99)
HCT VFR BLD AUTO: 36 % (ref 37–47)
HGB BLD-MCNC: 12.7 G/DL (ref 12–16)
IMM GRANULOCYTES # BLD AUTO: 0.02 K/UL (ref 0–0.11)
IMM GRANULOCYTES NFR BLD AUTO: 0.3 % (ref 0–0.9)
LYMPHOCYTES # BLD AUTO: 1.6 K/UL (ref 1–4.8)
LYMPHOCYTES NFR BLD: 22.8 % (ref 22–41)
MCH RBC QN AUTO: 33.2 PG (ref 27–33)
MCHC RBC AUTO-ENTMCNC: 35.3 G/DL (ref 32.2–35.5)
MCV RBC AUTO: 94 FL (ref 81.4–97.8)
MONOCYTES # BLD AUTO: 0.36 K/UL (ref 0–0.85)
MONOCYTES NFR BLD AUTO: 5.1 % (ref 0–13.4)
NEUTROPHILS # BLD AUTO: 4.89 K/UL (ref 1.82–7.42)
NEUTROPHILS NFR BLD: 69.5 % (ref 44–72)
NRBC # BLD AUTO: 0 K/UL
NRBC BLD-RTO: 0 /100 WBC (ref 0–0.2)
NUMBER OF RH DOSES IND 8505RD: NORMAL
PLATELET # BLD AUTO: 318 K/UL (ref 164–446)
PMV BLD AUTO: 9.7 FL (ref 9–12.9)
POTASSIUM SERPL-SCNC: 3.4 MMOL/L (ref 3.6–5.5)
PROT SERPL-MCNC: 6.8 G/DL (ref 6–8.2)
RBC # BLD AUTO: 3.83 M/UL (ref 4.2–5.4)
RH BLD: NORMAL
SODIUM SERPL-SCNC: 144 MMOL/L (ref 135–145)
WBC # BLD AUTO: 7 K/UL (ref 4.8–10.8)

## 2024-08-24 PROCEDURE — RXMED WILLOW AMBULATORY MEDICATION CHARGE: Performed by: EMERGENCY MEDICINE

## 2024-08-24 PROCEDURE — 93005 ELECTROCARDIOGRAM TRACING: CPT

## 2024-08-24 PROCEDURE — 80053 COMPREHEN METABOLIC PANEL: CPT

## 2024-08-24 PROCEDURE — 700102 HCHG RX REV CODE 250 W/ 637 OVERRIDE(OP): Performed by: EMERGENCY MEDICINE

## 2024-08-24 PROCEDURE — 36415 COLL VENOUS BLD VENIPUNCTURE: CPT

## 2024-08-24 PROCEDURE — 99284 EMERGENCY DEPT VISIT MOD MDM: CPT

## 2024-08-24 PROCEDURE — 86901 BLOOD TYPING SEROLOGIC RH(D): CPT

## 2024-08-24 PROCEDURE — A9270 NON-COVERED ITEM OR SERVICE: HCPCS | Performed by: EMERGENCY MEDICINE

## 2024-08-24 PROCEDURE — 700111 HCHG RX REV CODE 636 W/ 250 OVERRIDE (IP): Mod: JZ | Performed by: EMERGENCY MEDICINE

## 2024-08-24 PROCEDURE — 85025 COMPLETE CBC W/AUTO DIFF WBC: CPT

## 2024-08-24 PROCEDURE — 71046 X-RAY EXAM CHEST 2 VIEWS: CPT

## 2024-08-24 PROCEDURE — 84702 CHORIONIC GONADOTROPIN TEST: CPT

## 2024-08-24 PROCEDURE — 93005 ELECTROCARDIOGRAM TRACING: CPT | Performed by: EMERGENCY MEDICINE

## 2024-08-24 PROCEDURE — 96374 THER/PROPH/DIAG INJ IV PUSH: CPT

## 2024-08-24 RX ORDER — HYDROCODONE BITARTRATE AND ACETAMINOPHEN 5; 325 MG/1; MG/1
1 TABLET ORAL ONCE
Status: COMPLETED | OUTPATIENT
Start: 2024-08-24 | End: 2024-08-24

## 2024-08-24 RX ORDER — HYDROCODONE BITARTRATE AND ACETAMINOPHEN 5; 325 MG/1; MG/1
1 TABLET ORAL EVERY 6 HOURS PRN
Qty: 7 TABLET | Refills: 0 | Status: SHIPPED | OUTPATIENT
Start: 2024-08-24 | End: 2024-08-27

## 2024-08-24 RX ORDER — KETOROLAC TROMETHAMINE 15 MG/ML
15 INJECTION, SOLUTION INTRAMUSCULAR; INTRAVENOUS ONCE
Status: COMPLETED | OUTPATIENT
Start: 2024-08-24 | End: 2024-08-24

## 2024-08-24 RX ADMIN — KETOROLAC TROMETHAMINE 15 MG: 15 INJECTION, SOLUTION INTRAMUSCULAR; INTRAVENOUS at 22:06

## 2024-08-24 RX ADMIN — HYDROCODONE BITARTRATE AND ACETAMINOPHEN 1 TABLET: 5; 325 TABLET ORAL at 20:58

## 2024-08-24 ASSESSMENT — PAIN DESCRIPTION - PAIN TYPE: TYPE: ACUTE PAIN

## 2024-08-24 ASSESSMENT — FIBROSIS 4 INDEX: FIB4 SCORE: 0.7
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
## 2024-08-25 NOTE — ED NOTES
PT ambulated back to room with steady gait, PT accompanied by family. PT placed on monitor, call light within reach, and chart up for ERP.

## 2024-08-27 ENCOUNTER — OFFICE VISIT (OUTPATIENT)
Dept: SURGERY | Facility: MEDICAL CENTER | Age: 24
End: 2024-08-27
Attending: STUDENT IN AN ORGANIZED HEALTH CARE EDUCATION/TRAINING PROGRAM
Payer: COMMERCIAL

## 2024-08-27 VITALS
WEIGHT: 140 LBS | RESPIRATION RATE: 16 BRPM | OXYGEN SATURATION: 97 % | HEIGHT: 69 IN | HEART RATE: 78 BPM | BODY MASS INDEX: 20.73 KG/M2 | SYSTOLIC BLOOD PRESSURE: 109 MMHG | DIASTOLIC BLOOD PRESSURE: 80 MMHG

## 2024-08-27 DIAGNOSIS — J93.9 BILATERAL PNEUMOTHORACES: ICD-10-CM
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
## 2024-08-27 PROCEDURE — 99212 OFFICE O/P EST SF 10 MIN: CPT | Performed by: STUDENT IN AN ORGANIZED HEALTH CARE EDUCATION/TRAINING PROGRAM

## 2024-08-27 ASSESSMENT — FIBROSIS 4 INDEX: FIB4 SCORE: 0.38
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
## 2025-02-03 ENCOUNTER — APPOINTMENT (OUTPATIENT)
Dept: OBGYN | Facility: CLINIC | Age: 25
End: 2025-02-03
Payer: COMMERCIAL

## 2025-02-03 ENCOUNTER — HOSPITAL ENCOUNTER (OUTPATIENT)
Facility: MEDICAL CENTER | Age: 25
End: 2025-02-03
Attending: OBSTETRICS & GYNECOLOGY
Payer: COMMERCIAL

## 2025-02-03 VITALS — DIASTOLIC BLOOD PRESSURE: 68 MMHG | WEIGHT: 133 LBS | BODY MASS INDEX: 19.64 KG/M2 | SYSTOLIC BLOOD PRESSURE: 122 MMHG

## 2025-02-03 DIAGNOSIS — Z34.81 ENCOUNTER FOR SUPERVISION OF OTHER NORMAL PREGNANCY, FIRST TRIMESTER: ICD-10-CM

## 2025-02-03 DIAGNOSIS — Z34.81 PRENATAL CARE, SUBSEQUENT PREGNANCY IN FIRST TRIMESTER: ICD-10-CM

## 2025-02-03 PROCEDURE — 87491 CHLMYD TRACH DNA AMP PROBE: CPT

## 2025-02-03 PROCEDURE — 88142 CYTOPATH C/V THIN LAYER: CPT

## 2025-02-03 PROCEDURE — 3074F SYST BP LT 130 MM HG: CPT | Performed by: OBSTETRICS & GYNECOLOGY

## 2025-02-03 PROCEDURE — 3078F DIAST BP <80 MM HG: CPT | Performed by: OBSTETRICS & GYNECOLOGY

## 2025-02-03 PROCEDURE — 76817 TRANSVAGINAL US OBSTETRIC: CPT | Performed by: OBSTETRICS & GYNECOLOGY

## 2025-02-03 PROCEDURE — 87591 N.GONORRHOEAE DNA AMP PROB: CPT

## 2025-02-03 PROCEDURE — 0500F INITIAL PRENATAL CARE VISIT: CPT | Performed by: OBSTETRICS & GYNECOLOGY

## 2025-02-03 ASSESSMENT — EDINBURGH POSTNATAL DEPRESSION SCALE (EPDS)
THINGS HAVE BEEN GETTING ON TOP OF ME: NO, MOST OF THE TIME I HAVE COPED QUITE WELL
I HAVE FELT SAD OR MISERABLE: NO, NOT AT ALL
I HAVE BLAMED MYSELF UNNECESSARILY WHEN THINGS WENT WRONG: NOT VERY OFTEN
I HAVE BEEN SO UNHAPPY THAT I HAVE BEEN CRYING: NO, NEVER
I HAVE BEEN SO UNHAPPY THAT I HAVE HAD DIFFICULTY SLEEPING: NOT AT ALL
I HAVE LOOKED FORWARD WITH ENJOYMENT TO THINGS: AS MUCH AS I EVER DID
THE THOUGHT OF HARMING MYSELF HAS OCCURRED TO ME: NEVER
TOTAL SCORE: 6
I HAVE FELT SCARED OR PANICKY FOR NO GOOD REASON: NO, NOT MUCH
I HAVE BEEN ABLE TO LAUGH AND SEE THE FUNNY SIDE OF THINGS: DEFINITELY NOT SO MUCH NOW
I HAVE BEEN ANXIOUS OR WORRIED FOR NO GOOD REASON: HARDLY EVER

## 2025-02-03 ASSESSMENT — FIBROSIS 4 INDEX: FIB4 SCORE: 0.38
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
## 2025-02-03 NOTE — NON-PROVIDER
Pt here for NEW OB   Pap smear today   Nipts today   Taking prenatals  Pt states she does vape but will stop   Denies any VB   2nd pregnancy per patient

## 2025-02-04 DIAGNOSIS — Z34.81 PRENATAL CARE, SUBSEQUENT PREGNANCY IN FIRST TRIMESTER: ICD-10-CM

## 2025-02-04 LAB
C TRACH DNA GENITAL QL NAA+PROBE: NEGATIVE
N GONORRHOEA DNA GENITAL QL NAA+PROBE: NEGATIVE
SPECIMEN SOURCE: NORMAL

## 2025-02-14 LAB — THINPREP PAP, CYTOLOGY NL11781: NORMAL

## 2025-02-20 ENCOUNTER — RESULTS FOLLOW-UP (OUTPATIENT)
Dept: OBGYN | Facility: CLINIC | Age: 25
End: 2025-02-20

## 2025-02-20 DIAGNOSIS — R87.612 LGSIL ON PAP SMEAR OF CERVIX: ICD-10-CM

## 2025-02-21 NOTE — Clinical Note
REFERRAL APPROVAL NOTICE         Sent on February 21, 2025                   Kaylyn Peña  [address] [place] [address]                   Dear MsJerry Peña,    After a careful review of the medical information and benefit coverage, [facility] has processed your referral. See below for additional details.    If applicable, you must be actively enrolled with your insurance for coverage of the authorized service. If you have any questions regarding your coverage, please contact your insurance directly.    REFERRAL INFORMATION   Referral #:  [id number]  Referred-To Department    Referred-By Provider:  OB/Gyn    Jaxson Aranda M.D.   [facility]      [address]  [place] [id number] [phone] [address]  [place] [id number] [phone]    Referral Start Date:  02/20/2025  Referral End Date:   02/20/2026             SCHEDULING  If you do not already have an appointment, please call [phone] to make an appointment.     MORE INFORMATION  If you do not already have a [employer] account, sign up at: [email or web address]  You can access your medical information, make appointments, see lab results, billing information, and more.  If you have questions regarding this referral, please contact  the [facility] Referrals department at:             [phone]. Monday - Friday 8:00AM - 5:00PM.     Sincerely,    [facility]

## 2025-02-28 NOTE — PROGRESS NOTES
Pt here for OB follow-up  Last seen on: 2/3/25 Manoj DORADO, MARI MERINO  Phone: [phone]   Pharmacy verified   Rh type: O+  Pt states does not have any questions or concerns today. Does not want to know gender.     Genetic testing results: low risk   ^daughter has Mobius syndrome  - tobacco-patient vapes and smokes marijuana on a regular basis       Labs:  None completed and the rest of the prenatal labs have been ordered today.     US:  Not yet ordered ~ 2nd/3rd tri ordered today     Vaccines:  Flu: declined today

## 2025-03-02 LAB
Lab: NORMAL
NTRA 1P36 DELETION SYNDROME POPULATION-BASED RISK TEXT: NORMAL
NTRA 1P36 DELETION SYNDROME RESULT TEXT: NORMAL
NTRA 1P36 DELETION SYNDROME RISK SCORE TEXT: NORMAL
NTRA 22Q11.2 DELETION SYNDROME POPULATION-BASED RISK TEXT: NORMAL
NTRA 22Q11.2 DELETION SYNDROME RESULT TEXT: NORMAL
NTRA 22Q11.2 DELETION SYNDROME RISK SCORE TEXT: NORMAL
NTRA ANGELMAN SYNDROME POPULATION-BASED RISK TEXT: NORMAL
NTRA ANGELMAN SYNDROME RESULT TEXT: NORMAL
NTRA ANGELMAN SYNDROME RISK SCORE TEXT: NORMAL
NTRA CRI-DU-CHAT SYNDROME POPULATION-BASED RISK TEXT: NORMAL
NTRA CRI-DU-CHAT SYNDROME RESULT TEXT: NORMAL
NTRA CRI-DU-CHAT SYNDROME RISK SCORE TEXT: NORMAL
NTRA FETAL FRACTION: NORMAL
NTRA GENDER OF FETUS: NORMAL
NTRA MONOSOMY X AGE-BASED RISK TEXT: NORMAL
NTRA MONOSOMY X RESULT TEXT: NORMAL
NTRA MONOSOMY X RISK SCORE TEXT: NORMAL
NTRA PRADER-WILLI SYNDROME POPULATION-BASED RISK TEXT: NORMAL
NTRA PRADER-WILLI SYNDROME RESULT TEXT: NORMAL
NTRA PRADER-WILLI SYNDROME RISK SCORE TEXT: NORMAL
NTRA TRIPLOIDY RESULT TEXT: NORMAL
NTRA TRISOMY 13 AGE-BASED RISK TEXT: NORMAL
NTRA TRISOMY 13 RESULT TEXT: NORMAL
NTRA TRISOMY 13 RISK SCORE TEXT: NORMAL
NTRA TRISOMY 18 AGE-BASED RISK TEXT: NORMAL
NTRA TRISOMY 18 RESULT TEXT: NORMAL
NTRA TRISOMY 18 RISK SCORE TEXT: NORMAL
NTRA TRISOMY 21 AGE-BASED RISK TEXT: NORMAL
NTRA TRISOMY 21 RESULT TEXT: NORMAL
NTRA TRISOMY 21 RISK SCORE TEXT: NORMAL

## 2025-03-03 ENCOUNTER — ROUTINE PRENATAL (OUTPATIENT)
Dept: OBGYN | Facility: CLINIC | Age: 25
End: 2025-03-03
Payer: COMMERCIAL

## 2025-03-03 VITALS — SYSTOLIC BLOOD PRESSURE: 102 MMHG | WEIGHT: 136.4 LBS | BODY MASS INDEX: 20.14 KG/M2 | DIASTOLIC BLOOD PRESSURE: 58 MMHG

## 2025-03-03 DIAGNOSIS — O99.332 MATERNAL TOBACCO USE IN SECOND TRIMESTER: ICD-10-CM

## 2025-03-03 DIAGNOSIS — F31.70 BIPOLAR DISORDER IN FULL REMISSION, MOST RECENT EPISODE UNSPECIFIED TYPE (HCC): ICD-10-CM

## 2025-03-03 DIAGNOSIS — O09.92 SUPERVISION OF HIGH-RISK PREGNANCY, SECOND TRIMESTER: ICD-10-CM

## 2025-03-03 DIAGNOSIS — F12.90 MARIJUANA USE DURING PREGNANCY: ICD-10-CM

## 2025-03-03 DIAGNOSIS — O99.320 MARIJUANA USE DURING PREGNANCY: ICD-10-CM

## 2025-03-03 DIAGNOSIS — R87.612 LGSIL ON PAP SMEAR OF CERVIX: ICD-10-CM

## 2025-03-03 DIAGNOSIS — O99.891: ICD-10-CM

## 2025-03-03 PROBLEM — T14.90XA TRAUMA: Status: RESOLVED | Noted: 2024-08-10 | Resolved: 2025-03-03

## 2025-03-03 PROBLEM — Z78.9 NO CONTRAINDICATION TO DEEP VEIN THROMBOSIS (DVT) PROPHYLAXIS: Status: RESOLVED | Noted: 2024-08-10 | Resolved: 2025-03-03

## 2025-03-03 PROBLEM — J93.9 BILATERAL PNEUMOTHORACES: Status: RESOLVED | Noted: 2024-08-10 | Resolved: 2025-03-03

## 2025-03-03 PROCEDURE — 3078F DIAST BP <80 MM HG: CPT | Performed by: STUDENT IN AN ORGANIZED HEALTH CARE EDUCATION/TRAINING PROGRAM

## 2025-03-03 PROCEDURE — 0502F SUBSEQUENT PRENATAL CARE: CPT | Performed by: STUDENT IN AN ORGANIZED HEALTH CARE EDUCATION/TRAINING PROGRAM

## 2025-03-03 PROCEDURE — 3074F SYST BP LT 130 MM HG: CPT | Performed by: STUDENT IN AN ORGANIZED HEALTH CARE EDUCATION/TRAINING PROGRAM

## 2025-03-03 ASSESSMENT — FIBROSIS 4 INDEX: FIB4 SCORE: 0.38

## 2025-03-03 NOTE — ASSESSMENT & PLAN NOTE
- S/sx pregnancy, pre E, and  labor warning signs vs general discomforts discussed  - Fetal movements reviewed. Discussed patient may start noticing more movement over the next several weeks. Kick counts to begin at 28 weeks.   - Adequate hydration reinforced. Aim for drinking 8-12 cups (64-96 ounces) of water daily.   - Nutrition/exercise/vitamin education; continue PNV  - Encouraged tour of LnD/childbirth education classes: contact info provided   - Anticipatory guidance given  - Patient established with [facility], next appointment with them is on 3/14/25 for limited fetal anatomy and 25 for detailed fetal anatomy.   - Reviewed NIPT labs and PAP (LSIL, HPV not tested). Per Dr. De La Cruz's recommendation, f/u with colposcopy.   - Ordered remainder of prenatal panel lab work  Orders:    CBC WITHOUT DIFFERENTIAL; Future    T.PALLIDUM AB FORTINO (SCREENING); Future    VARICELLA ZOSTER IGG AB; Future    HIV AG/AB COMBO ASSAY SCREENING; Future    RUBELLA ABS IGG; Future    HEP B SURFACE ANTIGEN; Future    HEP C VIRUS ANTIBODY; Future    URINE CULTURE(NEW); Future    HEMOGLOBIN A1C; Future    HEP B SURFACE AB; Future    HEP B CORE AB TOTAL; Future

## 2025-03-03 NOTE — PROGRESS NOTES
S: 24 y.o.  at 14w6d presents for routine obstetric follow-up. Pregnancy complicated by tobacco (vape) and marijuana use in pregnancy, Bipolar disorder, previous child with Moebius Syndrome, and fetal hypoplastic nasal bone. She is currently [facility] with [facility]. Her next appointment with them is on 3/14/25 for limited fetal anatomy and 25 for detailed fetal anatomy.     Not yet sensing fetal movement. No frequent contractions, vaginal bleeding, leakage of fluid, or dysuria. Denies headaches, vision changes, abd pain, swelling of hands/face. She states that her nausea has improved and she has reduced her frequency of tobacco vape and marijuana use.     Patient has not yet had prenatal labwork done, ordered today and she plans to have it done before her next visit. She is agreeable to having urine drug screen done. Declines AFP today, already discussed with [facility].     O: /58   Wt 136 lb 6.4 oz   LMP 2024   BMI 20.14 kg/m²   Patients' weight gain, fluid intake and exercise level discussed.  Vitals, fundal height , fetal position, and FHR reviewed on flowsheet    Patient Active Problem List    Diagnosis Date Noted    Trauma 08/10/2024    Bilateral pneumothoraces 08/10/2024    No contraindication to deep vein thrombosis (DVT) prophylaxis 08/10/2024        Lab:  Recent Results (from the past 2 weeks)   [facility] PRENATAL TEST    Collection Time: 25 12:07 AM   Result Value Ref Range    REPORT SUMMARY LOW RISK     REPORT NOTE See Notes     GENDER OF FETUS Male     FETAL FRACTION 10.8%     TRISOMY 21 RESULT TEXT Low Risk     TRISOMY 21 AGE-BASED RISK TEXT 140 (0.09%)     TRISOMY 21 RISK SCORE TEXT <1/10,000 (<0.01%)     TRISOMY 18 RESULT TEXT Low Risk     TRISOMY 18 AGE-BASED RISK TEXT 3,015 (0.03%)     TRISOMY 18 RISK SCORE TEXT <1/10,000 (<0.01%)     TRISOMY 13 RESULT TEXT Low Risk     TRISOMY 13 AGE-BASED RISK TEXT ,389 (0.01%)     TRISOMY 13 RISK SCORE TEXT  <1/10,000 (<0.01%)     MONOSOMY X RESULT TEXT Low Risk     MONOSOMY X AGE-BASED RISK TEXT 1/568 (0.18%)     MONOSOMY X RISK SCORE TEXT </10,000 (<0.01%)     TRIPLOIDY RESULT TEXT Low Risk     22Q11.2 DELETION SYNDROME RESULT TEXT Low Risk     22Q11.2 DELETION SYNDROME POPULATION-BASED RISK TEXT ,000     22Q11.2 DELETION SYNDROME RISK SCORE TEXT ,000     1P36 DELETION SYNDROME RESULT TEXT Low Risk     1P36 DELETION SYNDROME POPULATION-BASED RISK TEXT 5,000     1P36 DELETION SYNDROME RISK SCORE TEXT ,400     ANGELMAN SYNDROME RESULT TEXT Low Risk     ANGELMAN SYNDROME POPULATION-BASED RISK TEXT ,000     ANGELMAN SYNDROME RISK SCORE TEXT ,600     CRI-DU-CHAT SYNDROME RESULT TEXT Low Risk     CRI-DU-CHAT SYNDROME POPULATION-BASED RISK TEXT ,000     CRI-DU-CHAT SYNDROME RISK SCORE TEXT ,100     PRADER-WILLI SYNDROME RESULT TEXT Low Risk     PRADER-WILLI SYNDROME POPULATION-BASED RISK TEXT 1/10,000     PRADER-WILLI SYNDROME RISK SCORE TEXT ,800     FOOTNOTES See Notes      Recent US: Established with [facility]. Next US with [facility] on 3/14/25 for limited fetal anatomy and 25 for detailed fetal anatomy.   TDaP: To be offered after 27w0d.  Flu: Declines, education provided  RSV: Educated on RSV vaccine at 32-36w  GBS: To be done in 36 wks.   Rh: positive (O positive)  1 hr GTT: To be done with midtrimester labs.  Genetic testing: Panorama - low risk.   STI testing: negative GC/CT, neg RPR.  PAP: 2/3/25 - LSIL. HPV not tested. Per Dr. Aranda, recommend f/u with colposcopy in second trimester.      A/P:  24 y.o.  at 14w6d presents for routine obstetric follow-up.  Size equals dates and/or scan     Problem List Items Addressed This Visit    None        Assessment & Plan  Supervision of high-risk pregnancy, second trimester  - S/sx pregnancy, pre E, and  labor warning signs vs general discomforts discussed  - Fetal movements reviewed. Discussed patient may start noticing more  movement over the next several weeks. Kick counts to begin at 28 weeks.   - Adequate hydration reinforced. Aim for drinking 8-12 cups (64-96 ounces) of water daily.   - Nutrition/exercise/vitamin education; continue PNV  - Encouraged tour of LnD/childbirth education classes: contact info provided   - Anticipatory guidance given  - Patient established with [facility], next appointment with them is on 3/14/25 for limited fetal anatomy and 4/11/25 for detailed fetal anatomy.   - Reviewed NIPT labs and PAP (LSIL, HPV not tested). Per Dr. De La Cruz's recommendation, f/u with colposcopy.   - Ordered remainder of prenatal panel lab work  Orders:    CBC WITHOUT DIFFERENTIAL; Future    T.PALLIDUM AB FORTINO (SCREENING); Future    VARICELLA ZOSTER IGG AB; Future    HIV AG/AB COMBO ASSAY SCREENING; Future    RUBELLA ABS IGG; Future    HEP B SURFACE ANTIGEN; Future    HEP C VIRUS ANTIBODY; Future    URINE CULTURE(NEW); Future    HEMOGLOBIN A1C; Future    HEP B SURFACE AB; Future    HEP B CORE AB TOTAL; Future    Maternal tobacco use in second trimester  - Patient aware of risks associated with tobacco use/vaping in pregnancy. She plans to decrease use of it. Encouraged cessation.  - Per recommendation of [facility], if patient continues daily vaping in pregnancy, recommends serial growth US starting at 28wks and NST starting at 32 wks       Marijuana use during pregnancy  - Patient uses marijuana as she feels it is the only thing that helps with her nausea.   - She is aware of risks associated with marijuana in pregnancy.and reports of already decreasing use of it. Encouraged cessation.       LGSIL on Pap smear of cervix  - PAP: 2/3/25 - LSIL. HPV not tested. Per Dr. Aranda, recommend f/u with colposcopy in second trimester.        Bipolar disorder in full remission, most recent episode unspecified type (HCC)  - Patient previously treated with lamotragine and recently rx'd Lyrica by psychiatrist. She has discussed benefits vs risks with both her  psychiatrist and perinatologist.   - She is also established with therapist and reports of good support system.        Pregnancy complicated by history of child with Moebius syndrome  - Established and consulted already with [facility]. NIPT done, low risk. Patient declines AFP.       Follow-up in 4 weeks for OB visit and soonest availability for colposcopy.     Melba Leiva P.A.-C.  [facility]

## 2025-03-03 NOTE — ASSESSMENT & PLAN NOTE
- Patient previously treated with lamotragine and recently rx'd Lyrica by psychiatrist. She has discussed benefits vs risks with both her psychiatrist and perinatologist.   - She is also established with therapist and reports of good support system.

## 2025-03-03 NOTE — ASSESSMENT & PLAN NOTE
- PAP: 2/3/25 - LSIL. HPV not tested. Per Dr. Aranda, recommend f/u with colposcopy in second trimester.

## 2025-03-03 NOTE — ASSESSMENT & PLAN NOTE
- Patient uses marijuana as she feels it is the only thing that helps with her nausea.   - She is aware of risks associated with marijuana in pregnancy.and reports of already decreasing use of it. Encouraged cessation.

## 2025-03-03 NOTE — ASSESSMENT & PLAN NOTE
- Patient aware of risks associated with tobacco use/vaping in pregnancy. She plans to decrease use of it. Encouraged cessation.  - [facility] if [facility] continues daily vaping in pregnancy, recommends serial growth US starting at 28wks and NST starting at 32 wks

## 2025-03-31 ENCOUNTER — ROUTINE PRENATAL (OUTPATIENT)
Dept: OBGYN | Facility: CLINIC | Age: 25
End: 2025-03-31
Payer: COMMERCIAL

## 2025-03-31 VITALS
WEIGHT: 143.2 LBS | SYSTOLIC BLOOD PRESSURE: 111 MMHG | BODY MASS INDEX: 21.15 KG/M2 | DIASTOLIC BLOOD PRESSURE: 67 MMHG | HEART RATE: 65 BPM

## 2025-03-31 DIAGNOSIS — O99.320 MARIJUANA USE DURING PREGNANCY: ICD-10-CM

## 2025-03-31 DIAGNOSIS — O99.332 MATERNAL TOBACCO USE IN SECOND TRIMESTER: ICD-10-CM

## 2025-03-31 DIAGNOSIS — O99.891: ICD-10-CM

## 2025-03-31 DIAGNOSIS — R87.612 LGSIL ON PAP SMEAR OF CERVIX: ICD-10-CM

## 2025-03-31 DIAGNOSIS — O28.3 ABNORMAL PREGNANCY US: ICD-10-CM

## 2025-03-31 DIAGNOSIS — F12.90 MARIJUANA USE DURING PREGNANCY: ICD-10-CM

## 2025-03-31 DIAGNOSIS — O09.92 SUPERVISION OF HIGH-RISK PREGNANCY, SECOND TRIMESTER: ICD-10-CM

## 2025-03-31 DIAGNOSIS — F31.70 BIPOLAR DISORDER IN FULL REMISSION, MOST RECENT EPISODE UNSPECIFIED TYPE (HCC): ICD-10-CM

## 2025-03-31 ASSESSMENT — FIBROSIS 4 INDEX: FIB4 SCORE: 0.38

## 2025-03-31 NOTE — ASSESSMENT & PLAN NOTE
- S/sx pregnancy, pre E, and labor warning signs vs general discomforts discussed  - Fetal movements and/or kick counts reviewed.   - Adequate hydration reinforced. Aim for drinking 8-12 cups (64-96 ounces) of water daily.   - Nutrition/exercise/vitamin education; continue PNV  - Encouraged tour of LnD/childbirth education classes: contact info provided   - Anticipatory guidance given  - Encouraged patient to have prenatal labs completed as soon as possible.

## 2025-03-31 NOTE — ASSESSMENT & PLAN NOTE
- Patient uses marijuana as she feels it is the only thing that helps with her nausea. Her nausea has significantly improved and so she has now decreased her marijuana use to once a day.  - She is aware of risks associated with marijuana in pregnancy.and reports of already decreasing use of it. Encouraged cessation.

## 2025-03-31 NOTE — ASSESSMENT & PLAN NOTE
- She is established with psychiatry who recently recommended switching her to Lyrica for Bipoloar (patient states she will pick it up this week) and is still seeing therapist once a month.   - Patient reports feeling that her mood is stable.

## 2025-03-31 NOTE — PROGRESS NOTES
Pt here for OB follow-up  Last seen on: 3/3/25 Melba   +FM  No VB, LOF, UC  Phone: [phone]   Pharmacy verified   Rh type: O (+)  Pt states she is requesting an US today so she can have physical pictures.     Genetic testing results: low risk (male)    [facility]: [facility]. Placenta was anterior in implantation grade II. Has another appointment scheduled for 4/11/25    Labs:  Not completed the rest of the prental labs     US:  Completed with [facility].. see above, wnl otherwise.     Vaccines:  FLU: declined on 3/3/25

## 2025-03-31 NOTE — ASSESSMENT & PLAN NOTE
- PAP: 2/3/25 - LSIL. HPV not tested. Per Dr. Aranda, recommend f/u with colposcopy in second trimester.   - Scheduled for colposcopy with Dr. Hassan on 4/3/25.

## 2025-03-31 NOTE — ASSESSMENT & PLAN NOTE
- Patient aware of risks associated with tobacco use/vaping in pregnancy. She plans to decrease use of it. Currently vaping once every few weeks. Encouraged cessation.  - [facility] if [facility] continues daily vaping in pregnancy, recommends serial growth US starting at 28wks and NST starting at 32 wks

## 2025-03-31 NOTE — PROGRESS NOTES
S: 24 y.o.  at 18w6d presents for routine obstetric follow-up.  Pregnancy complicated by tobacco (vape) and marijuana use in pregnancy, Bipolar disorder, previous child with Moebius Syndrome, and fetal hypoplastic nasal bone. She is currently established with [facility].    Patient is starting to feel fetal movement. Discussed fetal kick counts starting at 28 wks. No frequent contractions, vaginal bleeding, leakage of fluid, or dysuria. Denies headaches, vision changes, abd pain, swelling of hands/face.     She had appointment with [facility] on 3/14/25 for limited fetal anatomy which they noted hypoplastic nasal bone noted, otherwise, normal fetal anatomic survey. She is already scheduled for a detailed anatomy US with them on 25.    Regarding her Bipolar disorder, she is established with psychiatry who recently recommended switching her to Lyrica for Bipoloar (will pick it up this week) and is still seeing therapist once a month. Overall, she feels like her mood is stable and she is doing well.     She has not completed her prenatal labs yet but states she will get it done before the end of this week.     Patient reports she continues to decrease her marijuana and vape use. She is now vaping once every few weeks and using marijuana once a day.     O: LMP 2024   Patients' weight gain, fluid intake and exercise level discussed.  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  Most recent US:  3/14/25:  g. Anterior placenta. Hypoplastic nasal bone noted, otherwise, normal fetal anatomic survey. Repeat US in 4 wks for detailed fetal anatomy, scheduled for 25.  TDaP: To be offered after 27w0d.  Flu: Declines, education provided  RSV: Educated on RSV vaccine at 32-36w  GBS: To be done in 36 wks.   Rh: positive (O positive)  1 hr GTT: To be done with midtrimester labs.  Genetic testing: Panorama - low risk. NIPT low risk.   STI testing: negative GC/CT, neg RPR.  PAP: 2/3/25 - LSIL. HPV not tested. Per Dr. Arnada, recommend f/u with colposcopy in second trimester. Colpo scheduled with Dr. Hernandez on 4/3/25.     A/P:  24 y.o.  at 18w6d presents for routine obstetric follow-up.  Size equals dates and/or scan     Assessment & Plan  Supervision of high-risk pregnancy, second trimester  - S/sx pregnancy, pre E, and labor warning signs vs general discomforts discussed  - Fetal movements and/or kick counts reviewed.   - Adequate hydration reinforced. Aim for drinking 8-12 cups (64-96 ounces) of water daily.   - Nutrition/exercise/vitamin education; continue PNV  - Encouraged tour of LnD/childbirth education classes: contact info provided   - Anticipatory guidance given  - Encouraged patient to have prenatal labs completed as soon as possible.        Abnormal pregnancy US  - Most recent US at [facility]: 3/14/25:  g. Hypoplastic nasal bone noted, otherwise, normal fetal anatomic survey. Repeat US in 4 wks for detailed fetal anatomy, scheduled for 25.       LGSIL on Pap smear of cervix  - PAP: 2/3/25 - LSIL. HPV not tested. Per Dr. Aranda, recommend f/u with colposcopy in second trimester.   - Scheduled for colposcopy with Dr. Hassan on 4/3/25.       Marijuana use during pregnancy  - Patient uses marijuana as she feels it is the only thing that helps with her nausea. Her nausea has significantly improved and so she has now decreased her marijuana use to once a day.  - She is aware of risks  associated with marijuana in pregnancy.and reports of already decreasing use of it. Encouraged cessation.       Maternal tobacco use in second trimester  - Patient aware of risks associated with tobacco use/vaping in pregnancy. She plans to decrease use of it. Currently vaping once every few weeks. Encouraged cessation.  - Per recommendation of [facility], if patient continues daily vaping in pregnancy, recommends serial growth US starting at 28wks and NST starting at 32 wks       Bipolar disorder in full remission, most recent episode unspecified type (HCC)  - She is established with psychiatry who recently recommended switching her to Lyrica for Bipoloar (patient states she will pick it up this week) and is still seeing therapist once a month.   - Patient reports feeling that her mood is stable.        Pregnancy complicated by history of child with Moebius syndrome  - Established with [facility]. NIPT done, low risk.        Follow-up in 4 weeks with physician for OB follow-up.    Melba Leiva P.A.-C.  [facility]

## 2025-04-01 NOTE — PROGRESS NOTES
Pt presents for GYN procedure-Colposcopy during pregnancy at 19w2d.  Pap 2/2/2025=LGSIL, no HPV done.  Previous Hx of Abnormal?  NKDA  Ph# [phone]  Ph# Pricila

## 2025-04-03 ENCOUNTER — GYNECOLOGY VISIT (OUTPATIENT)
Dept: OBGYN | Facility: CLINIC | Age: 25
End: 2025-04-03
Payer: COMMERCIAL

## 2025-04-03 ENCOUNTER — TELEPHONE (OUTPATIENT)
Dept: OBGYN | Facility: CLINIC | Age: 25
End: 2025-04-03

## 2025-04-03 NOTE — TELEPHONE ENCOUNTER
Phone call to pt to advise that she does not need Colposcopy at this time. Per SHENA she will need repap 1 year.ALONDRA

## 2025-04-27 ENCOUNTER — APPOINTMENT (OUTPATIENT)
Dept: RADIOLOGY | Facility: MEDICAL CENTER | Age: 25
End: 2025-04-27
Payer: COMMERCIAL

## 2025-04-27 ENCOUNTER — HOSPITAL ENCOUNTER (EMERGENCY)
Facility: MEDICAL CENTER | Age: 25
End: 2025-04-27
Attending: OBSTETRICS & GYNECOLOGY | Admitting: OBSTETRICS & GYNECOLOGY
Payer: COMMERCIAL

## 2025-04-27 ENCOUNTER — HOSPITAL ENCOUNTER (EMERGENCY)
Facility: MEDICAL CENTER | Age: 25
End: 2025-04-28
Attending: OBSTETRICS & GYNECOLOGY | Admitting: OBSTETRICS & GYNECOLOGY
Payer: COMMERCIAL

## 2025-04-27 ENCOUNTER — APPOINTMENT (OUTPATIENT)
Dept: RADIOLOGY | Facility: MEDICAL CENTER | Age: 25
End: 2025-04-27
Attending: OBSTETRICS & GYNECOLOGY
Payer: COMMERCIAL

## 2025-04-27 VITALS
BODY MASS INDEX: 21.18 KG/M2 | OXYGEN SATURATION: 100 % | TEMPERATURE: 96.6 F | RESPIRATION RATE: 20 BRPM | HEART RATE: 54 BPM | WEIGHT: 143 LBS | SYSTOLIC BLOOD PRESSURE: 125 MMHG | HEIGHT: 69 IN | DIASTOLIC BLOOD PRESSURE: 82 MMHG

## 2025-04-27 VITALS
RESPIRATION RATE: 16 BRPM | DIASTOLIC BLOOD PRESSURE: 75 MMHG | OXYGEN SATURATION: 97 % | HEART RATE: 60 BPM | SYSTOLIC BLOOD PRESSURE: 116 MMHG

## 2025-04-27 PROBLEM — R10.30 LOWER ABDOMINAL PAIN: Status: ACTIVE | Noted: 2025-04-27

## 2025-04-27 LAB
AMORPHOUS CRYSTALS 1764: PRESENT /HPF
APPEARANCE UR: ABNORMAL
APPEARANCE UR: ABNORMAL
BACTERIA #/AREA URNS HPF: ABNORMAL /HPF
BASOPHILS # BLD AUTO: 0.1 % (ref 0–1.8)
BASOPHILS # BLD: 0.02 K/UL (ref 0–0.12)
BILIRUB UR QL STRIP.AUTO: ABNORMAL
BILIRUB UR QL STRIP.AUTO: ABNORMAL
CASTS URNS QL MICRO: ABNORMAL /LPF (ref 0–2)
COLOR UR AUTO: ABNORMAL
COLOR UR: ABNORMAL
EOSINOPHIL # BLD AUTO: 0.02 K/UL (ref 0–0.51)
EOSINOPHIL NFR BLD: 0.1 % (ref 0–6.9)
EPITHELIAL CELLS 1715: ABNORMAL /HPF (ref 0–5)
ERYTHROCYTE [DISTWIDTH] IN BLOOD BY AUTOMATED COUNT: 44.3 FL (ref 35.9–50)
GLUCOSE UR QL STRIP.AUTO: 100 MG/DL
GLUCOSE UR STRIP.AUTO-MCNC: NEGATIVE MG/DL
HBV SURFACE AG SER QL: NORMAL
HCT VFR BLD AUTO: 36.2 % (ref 37–47)
HGB BLD-MCNC: 12.5 G/DL (ref 12–16)
HIV 1+2 AB+HIV1 P24 AG SERPL QL IA: NORMAL
IMM GRANULOCYTES # BLD AUTO: 0.09 K/UL (ref 0–0.11)
IMM GRANULOCYTES NFR BLD AUTO: 0.6 % (ref 0–0.9)
KETONES UR QL STRIP.AUTO: 80 MG/DL
KETONES UR STRIP.AUTO-MCNC: 80 MG/DL
LEUKOCYTE ESTERASE UR QL STRIP.AUTO: NEGATIVE
LEUKOCYTE ESTERASE UR QL STRIP.AUTO: NEGATIVE
LYMPHOCYTES # BLD AUTO: 0.89 K/UL (ref 1–4.8)
LYMPHOCYTES NFR BLD: 5.9 % (ref 22–41)
MCH RBC QN AUTO: 32.5 PG (ref 27–33)
MCHC RBC AUTO-ENTMCNC: 34.5 G/DL (ref 32.2–35.5)
MCV RBC AUTO: 94 FL (ref 81.4–97.8)
MICRO URNS: ABNORMAL
MONOCYTES # BLD AUTO: 0.58 K/UL (ref 0–0.85)
MONOCYTES NFR BLD AUTO: 3.9 % (ref 0–13.4)
NEUTROPHILS # BLD AUTO: 13.42 K/UL (ref 1.82–7.42)
NEUTROPHILS NFR BLD: 89.4 % (ref 44–72)
NITRITE UR QL STRIP.AUTO: NEGATIVE
NITRITE UR QL STRIP.AUTO: NEGATIVE
NRBC # BLD AUTO: 0 K/UL
NRBC BLD-RTO: 0 /100 WBC (ref 0–0.2)
PH UR STRIP.AUTO: 7 [PH] (ref 5–8)
PH UR STRIP.AUTO: 7 [PH] (ref 5–8)
PLATELET # BLD AUTO: 247 K/UL (ref 164–446)
PMV BLD AUTO: 10.1 FL (ref 9–12.9)
PROT UR QL STRIP: 100 MG/DL
PROT UR QL STRIP: 30 MG/DL
RBC # BLD AUTO: 3.85 M/UL (ref 4.2–5.4)
RBC # URNS HPF: ABNORMAL /HPF (ref 0–2)
RBC UR QL AUTO: ABNORMAL
RBC UR QL AUTO: ABNORMAL
RUBV AB SER QL: 140 IU/ML
SP GR UR STRIP.AUTO: 1.02
SP GR UR STRIP.AUTO: 1.02 (ref 1–1.03)
T PALLIDUM AB SER QL IA: NORMAL
UROBILINOGEN UR STRIP.AUTO-MCNC: 2 EU/DL
UROBILINOGEN UR STRIP.AUTO-MCNC: 4 MG/DL
WBC # BLD AUTO: 15 K/UL (ref 4.8–10.8)
WBC #/AREA URNS HPF: ABNORMAL /HPF

## 2025-04-27 PROCEDURE — 81002 URINALYSIS NONAUTO W/O SCOPE: CPT

## 2025-04-27 PROCEDURE — 99284 EMERGENCY DEPT VISIT MOD MDM: CPT

## 2025-04-27 PROCEDURE — 700102 HCHG RX REV CODE 250 W/ 637 OVERRIDE(OP): Mod: UD | Performed by: PHYSICIAN ASSISTANT

## 2025-04-27 PROCEDURE — 99283 EMERGENCY DEPT VISIT LOW MDM: CPT

## 2025-04-27 PROCEDURE — 36415 COLL VENOUS BLD VENIPUNCTURE: CPT

## 2025-04-27 PROCEDURE — 99282 EMERGENCY DEPT VISIT SF MDM: CPT | Performed by: PHYSICIAN ASSISTANT

## 2025-04-27 PROCEDURE — 700105 HCHG RX REV CODE 258

## 2025-04-27 PROCEDURE — 85025 COMPLETE CBC W/AUTO DIFF WBC: CPT

## 2025-04-27 PROCEDURE — 81001 URINALYSIS AUTO W/SCOPE: CPT

## 2025-04-27 PROCEDURE — A9270 NON-COVERED ITEM OR SERVICE: HCPCS | Mod: UD | Performed by: PHYSICIAN ASSISTANT

## 2025-04-27 PROCEDURE — 87340 HEPATITIS B SURFACE AG IA: CPT

## 2025-04-27 PROCEDURE — 87086 URINE CULTURE/COLONY COUNT: CPT

## 2025-04-27 PROCEDURE — 86762 RUBELLA ANTIBODY: CPT

## 2025-04-27 PROCEDURE — 96374 THER/PROPH/DIAG INJ IV PUSH: CPT

## 2025-04-27 PROCEDURE — 76775 US EXAM ABDO BACK WALL LIM: CPT

## 2025-04-27 PROCEDURE — 700111 HCHG RX REV CODE 636 W/ 250 OVERRIDE (IP): Mod: JZ

## 2025-04-27 PROCEDURE — G0475 HIV COMBINATION ASSAY: HCPCS

## 2025-04-27 PROCEDURE — 86780 TREPONEMA PALLIDUM: CPT

## 2025-04-27 PROCEDURE — 76815 OB US LIMITED FETUS(S): CPT

## 2025-04-27 PROCEDURE — 99282 EMERGENCY DEPT VISIT SF MDM: CPT | Performed by: OBSTETRICS & GYNECOLOGY

## 2025-04-27 RX ORDER — ONDANSETRON 2 MG/ML
4 INJECTION INTRAMUSCULAR; INTRAVENOUS ONCE
Status: COMPLETED | OUTPATIENT
Start: 2025-04-27 | End: 2025-04-27

## 2025-04-27 RX ORDER — ACETAMINOPHEN 325 MG/1
650 TABLET ORAL ONCE
Status: DISCONTINUED | OUTPATIENT
Start: 2025-04-27 | End: 2025-04-27 | Stop reason: HOSPADM

## 2025-04-27 RX ORDER — SODIUM CHLORIDE, SODIUM LACTATE, POTASSIUM CHLORIDE, AND CALCIUM CHLORIDE .6; .31; .03; .02 G/100ML; G/100ML; G/100ML; G/100ML
1000 INJECTION, SOLUTION INTRAVENOUS ONCE
Status: COMPLETED | OUTPATIENT
Start: 2025-04-27 | End: 2025-04-27

## 2025-04-27 RX ORDER — ACETAMINOPHEN 500 MG
1000 TABLET ORAL EVERY 4 HOURS PRN
Status: DISCONTINUED | OUTPATIENT
Start: 2025-04-27 | End: 2025-04-28 | Stop reason: HOSPADM

## 2025-04-27 RX ADMIN — SODIUM CHLORIDE, POTASSIUM CHLORIDE, SODIUM LACTATE AND CALCIUM CHLORIDE 1000 ML: 600; 310; 30; 20 INJECTION, SOLUTION INTRAVENOUS at 11:17

## 2025-04-27 RX ADMIN — ONDANSETRON 4 MG: 2 INJECTION INTRAMUSCULAR; INTRAVENOUS at 11:17

## 2025-04-27 RX ADMIN — ACETAMINOPHEN 1000 MG: 500 TABLET ORAL at 23:47

## 2025-04-27 ASSESSMENT — FIBROSIS 4 INDEX: FIB4 SCORE: 0.38

## 2025-04-27 ASSESSMENT — PAIN SCALES - GENERAL
PAINLEVEL: 6
PAINLEVEL: 9

## 2025-04-27 ASSESSMENT — ENCOUNTER SYMPTOMS
CHILLS: 0
FEVER: 0

## 2025-04-27 ASSESSMENT — LIFESTYLE VARIABLES: SUBSTANCE_ABUSE: 1

## 2025-04-27 NOTE — PROGRESS NOTES
[facility] -  EGA - 22.5    1006 - Pt arrived to labor and delivery for abdominal pain. Pt placed in room LDA5.    1024 - Doppler 150. Melody Hill in place. VSS. Pt reports good FM. No complaints of ROM or vaginal bleeding. States abdominal pain that wraps around to back starting at 0700. Left CVA tenderness on examination, rated 9/10. Has not had any pain medication today. Not able to void at this time, and is with active emesis. Denies UTI symptoms. FOB at bedside. POC discussed with pt and family members, all questions answered.      1035 Dr. Vanessa given report. At bedside to assess patient status. Orders received.     1330 Dr. Vanessa at bedside to discuss POC with patient. Discharge orders received.     1340 Discharge instructions given including, hydration, PTL precautions, and when to return to the hospital, Pt verbalizes understanding at this time. All questions answered.    1342 Pt ambulated off the unit with personal belongings in place.

## 2025-04-27 NOTE — ASSESSMENT & PLAN NOTE
Severe lower abdominal pain since 7a this morning before presenting to triage, with vomiting. Reports feels like contractions. Vomiting, unable to keep anything down this morning. Concern for pyelonephritis vs nephrolithiasis vs intrauterine pathology like concealed abruption, however is less likely. Large blood on urine dip, increased concern for stone.   Significant Improvement after initiation of fluid bolus and Zofran. Declined tylenol.   - LR bolus, Zofran, Tylenol ordered  - Limited Ob US showed no acute pathology, but did note fetal pyelectasis. Recommend follow up with [facility].   - UA, reflex culture ordered   - Urine dip positive for large blood   - Renal US did not show stones or other acute pathology  - Prenatal labs ordered as has not yet completed    Possibly renal stone that has passed. Recommend close follow up

## 2025-04-27 NOTE — ED PROVIDER NOTES
OB ED EVALUATION NOTE    SUBJECTIVE:  Kaylyn Peña is a 24 y.o.,  at 22w5d who presents for abdominal pain. She denies vaginal bleeding, leakage of fluid. She states the baby is moving.     She reports she has been having severe lower abdominal pain since 7a this morning, feels like contractions and has not gone away in the last 4 hours. She endorseses feeling her abdomen get hard coinciding with the pain. She has been having vomiting since this happened and is unable to keep anything down.     PNC with [facility] and [facility]    Complications in pregnancy:   - TOB, marijuana vaping  - Bipolar disorder, follows with psych, on Lyrica  - fetal hypolastic nasal bone on US  - Hx child with Mobius syndrome  - LSIL in pregnancy, no colpo completed    I personally reviewed the past medical and surgical histories, as well as the problem list.    OBJECTIVE:  Vital Signs:   Vitals:    25 1030   BP: 125/82   Pulse: (!) 54   Resp: 20   Temp: 35.9 °C (96.6 °F)   SpO2: 100%     GEN: NAD  Abd: gravid, significant abd tenderness to RLQ, LLQ and epigastric region, L CVA tenderness  Ext: no edema    Pelvic:   deferred    FHT: Doppler 150  [place]: uterine irritability/possible contractions however unable to differentiate between contractions and vomiting episodes    LABS / IMAGING:  Results for orders placed or performed during the hospital encounter of 25   URINALYSIS    Collection Time: 25 12:00 PM    Specimen: Urine, Clean Catch   Result Value Ref Range    Color Dark yellow     Character Cloudy (A)     Specific Gravity 1.025 <1.035    Ph 7.0 5.0 - 8.0    Glucose Negative Negative mg/dL    Ketones 80 (A) Negative mg/dL    Protein 30 (A) Negative mg/dL    Bilirubin Small (A) Negative    Urobilinogen, Urine 2.0 (A) <=1.0 EU/dL    Nitrite Negative Negative    Leukocyte Esterase Negative Negative    Occult Blood Moderate (A) Negative    Micro Urine Req Microscopic    URINE MICROSCOPIC (W/UA)    Collection Time:  04/27/25 12:00 PM   Result Value Ref Range    WBC 6-10 (A) /hpf    RBC 21-50 (A) 0 - 2 /hpf    Bacteria None None /hpf    Epithelial Cells 6-10 (A) 0 - 5 /hpf    Amorphous Crystal Present (A) Absent /hpf    Urine Casts 0-2 0 - 2 /lpf   POCT urinalysis device results    Collection Time: 04/27/25 12:02 PM   Result Value Ref Range    POC Color Dark yellow     POC Appearance Cloudy (A)     POC Glucose 100 (A) Negative mg/dL    POC Ketones 80 (A) Negative mg/dL    POC Specific Gravity 1.025 1.005 - 1.030    POC Blood Large (A) Negative    POC Urine PH 7.0 5.0 - 8.0    POC Protein 100 (A) Negative mg/dL    Bilirubin Small (A) Negative    Urobilinogen, Urine 4.0 Negative    POC Nitrites Negative Negative    POC Leukocyte Esterase Negative Negative   CBC WITH DIFFERENTIAL    Collection Time: 04/27/25 12:40 PM   Result Value Ref Range    WBC 15.0 (H) 4.8 - 10.8 K/uL    RBC 3.85 (L) 4.20 - 5.40 M/uL    Hemoglobin 12.5 12.0 - 16.0 g/dL    Hematocrit 36.2 (L) 37.0 - 47.0 %    MCV 94.0 81.4 - 97.8 fL    MCH 32.5 27.0 - 33.0 pg    MCHC 34.5 32.2 - 35.5 g/dL    RDW 44.3 35.9 - 50.0 fL    Platelet Count 247 164 - 446 K/uL    MPV 10.1 9.0 - 12.9 fL    Neutrophils-Polys 89.40 (H) 44.00 - 72.00 %    Lymphocytes 5.90 (L) 22.00 - 41.00 %    Monocytes 3.90 0.00 - 13.40 %    Eosinophils 0.10 0.00 - 6.90 %    Basophils 0.10 0.00 - 1.80 %    Immature Granulocytes 0.60 0.00 - 0.90 %    Nucleated RBC 0.00 0.00 - 0.20 /100 WBC    Neutrophils (Absolute) 13.42 (H) 1.82 - 7.42 K/uL    Lymphs (Absolute) 0.89 (L) 1.00 - 4.80 K/uL    Monos (Absolute) 0.58 0.00 - 0.85 K/uL    Eos (Absolute) 0.02 0.00 - 0.51 K/uL    Baso (Absolute) 0.02 0.00 - 0.12 K/uL    Immature Granulocytes (abs) 0.09 0.00 - 0.11 K/uL    NRBC (Absolute) 0.00 K/uL   HEP B SURFACE ANTIGEN    Collection Time: 04/27/25 12:40 PM   Result Value Ref Range    Hepatitis B Surface Antigen Non-Reactive Non-Reactive   T.PALLIDUM AB FORTINO (SCREENING)    Collection Time: 04/27/25 12:40 PM    Result Value Ref Range    Syphilis, Treponemal Qual Non-Reactive Non-Reactive   RUBELLA ABS IGG    Collection Time: 25 12:40 PM   Result Value Ref Range    Rubella IgG Antibody 140.00 IU/mL   HIV AG/AB COMBO ASSAY DIAGNOSTIC    Collection Time: 25 12:40 PM   Result Value Ref Range    HIV Ag/Ab Combo Assay Non-Reactive Non Reactive         ASSESSMENT AND PLAN:  24 y.o.    Patient Active Problem List    Diagnosis Date Noted    Lower abdominal pain 2025    Supervision of high-risk pregnancy, second trimester 2025    Maternal tobacco use in second trimester 2025    Marijuana use during pregnancy 2025    Bipolar disorder in full remission (HCC) 2025    Pregnancy complicated by history of child with Moebius syndrome 2025    LGSIL on Pap smear of cervix 2025       Lower abdominal pain  Assessment & Plan  Severe lower abdominal pain since 7a this morning before presenting to triage, with vomiting. Reports feels like contractions. Vomiting, unable to keep anything down this morning. Concern for pyelonephritis vs nephrolithiasis vs intrauterine pathology like concealed abruption, however is less likely. Large blood on urine dip, increased concern for stone.   Significant Improvement after initiation of fluid bolus and Zofran. Declined tylenol.   - LR bolus, Zofran, Tylenol ordered  - Limited Ob US showed no acute pathology, but did note fetal pyelectasis. Recommend follow up with [facility].   - UA, reflex culture ordered   - Urine dip positive for large blood   - Renal US did not show stones or other acute pathology  - Prenatal labs ordered as has not yet completed    Possibly renal stone that has passed. Recommend close follow up         Next prenatal appointment is tomorrow, , counseled to keep appointment and follow up on labs.   Strict return precautions provided  She was counseled to call or return for vaginal bleeding, regular contractions, leakage of fluid  or decreased fetal movement.    Magali Vanessa, DO  PGY1  [facility]

## 2025-04-28 ENCOUNTER — ROUTINE PRENATAL (OUTPATIENT)
Dept: OBGYN | Facility: CLINIC | Age: 25
End: 2025-04-28
Payer: COMMERCIAL

## 2025-04-28 VITALS
SYSTOLIC BLOOD PRESSURE: 109 MMHG | WEIGHT: 143 LBS | DIASTOLIC BLOOD PRESSURE: 71 MMHG | HEART RATE: 78 BPM | BODY MASS INDEX: 21.12 KG/M2

## 2025-04-28 DIAGNOSIS — O99.320 MARIJUANA USE DURING PREGNANCY: ICD-10-CM

## 2025-04-28 DIAGNOSIS — O99.332 MATERNAL TOBACCO USE IN SECOND TRIMESTER: ICD-10-CM

## 2025-04-28 DIAGNOSIS — O99.891: ICD-10-CM

## 2025-04-28 DIAGNOSIS — F12.90 MARIJUANA USE DURING PREGNANCY: ICD-10-CM

## 2025-04-28 DIAGNOSIS — O09.92 SUPERVISION OF HIGH-RISK PREGNANCY, SECOND TRIMESTER: ICD-10-CM

## 2025-04-28 DIAGNOSIS — O28.3 ABNORMAL FETAL ULTRASOUND: ICD-10-CM

## 2025-04-28 DIAGNOSIS — R87.612 LGSIL ON PAP SMEAR OF CERVIX: ICD-10-CM

## 2025-04-28 DIAGNOSIS — F31.70 BIPOLAR DISORDER IN FULL REMISSION, MOST RECENT EPISODE UNSPECIFIED TYPE (HCC): ICD-10-CM

## 2025-04-28 RX ORDER — ONDANSETRON 4 MG/1
4 TABLET, ORALLY DISINTEGRATING ORAL EVERY 6 HOURS PRN
Qty: 20 TABLET | Refills: 0 | Status: SHIPPED | OUTPATIENT
Start: 2025-04-28

## 2025-04-28 ASSESSMENT — FIBROSIS 4 INDEX: FIB4 SCORE: 0.48

## 2025-04-28 NOTE — PROGRESS NOTES
S: 24 y.o.  at 22w6d presents for routine obstetric follow-up. Pregnancy complicated by history of child with Moebius syndrome, abnormal fetal US (hypoplastic nasal bone, bilateral fetal renal pyelectasis), bipolar disorder (well controlled w/o medication), and marijuana and tobacco use in pregnancy.     Good fetal movement. No frequent contractions, vaginal bleeding, leakage of fluid, or dysuria. Denies headaches, vision changes, abd pain, swelling of hands/face.     She went to ER yesterday for severe lower abdominal/back pain since 7am in morning (concerned that it felt like contractions), persistent nausea/vomiting, unable to keep solids/fluids down. Patient had workup with suspicion of kidney stones. Renal US of showed no renal calculi but did note of mild left hydronephrosis likely secondary to mass effect on distal left ureter by gravid uterus. However, fetal US in ED showed bilateral fetal renal pyelectacsis with AP diameter of renal pelvis measuring 5.3 mm on right and 5.4 mm on left. Otherwise, wnl. Urine culture still pending.    Overall, she reports that her abdominal/back pain has significantly improved. She occasionally still has nausea/vomiting but has now been able to tolerate liquids. She plans to  Zofran later today from the pharmacy. Denies F/C.     Patient had fetal anatomy with [facility] on 25  which showed  g. 39%. CL 4.80 cm. Breech. Fetal growth wnl. Hypoplastic nasal bone present. Recommended repeat US in 10 wks for fetal growth due to previous child with Mobius Syndrome and tobacco use in in pregnancy. Scheduled for 25.    She states she is not longer vaping but does report of marijuana on occasion/rarely    For bipolar disorder, she continues to meet with psychiatrist and therapy regularly but is not taking Lyrica. States that she feels stable without it, denies any intrusive thoughts.      O: LMP 2024   Patients' weight gain, fluid intake and exercise level  discussed.  Vitals, fundal height , fetal position, and FHR reviewed on flowsheet    Patient Active Problem List    Diagnosis Date Noted    Lower abdominal pain 04/27/2025    Supervision of high-risk pregnancy, second trimester 03/03/2025    Maternal tobacco use in second trimester 03/03/2025    Marijuana use during pregnancy 03/03/2025    Bipolar disorder in full remission (HCC) 03/03/2025    Pregnancy complicated by history of child with Moebius syndrome 03/03/2025    LGSIL on Pap smear of cervix 03/03/2025        Lab:  Recent Results (from the past 2 weeks)   URINALYSIS    Collection Time: 04/27/25 12:00 PM    Specimen: Urine, Clean Catch   Result Value Ref Range    Color Dark yellow     Character Cloudy (A)     Specific Gravity 1.025 <1.035    Ph 7.0 5.0 - 8.0    Glucose Negative Negative mg/dL    Ketones 80 (A) Negative mg/dL    Protein 30 (A) Negative mg/dL    Bilirubin Small (A) Negative    Urobilinogen, Urine 2.0 (A) <=1.0 EU/dL    Nitrite Negative Negative    Leukocyte Esterase Negative Negative    Occult Blood Moderate (A) Negative    Micro Urine Req Microscopic    URINE MICROSCOPIC (W/UA)    Collection Time: 04/27/25 12:00 PM   Result Value Ref Range    WBC 6-10 (A) /hpf    RBC 21-50 (A) 0 - 2 /hpf    Bacteria None None /hpf    Epithelial Cells 6-10 (A) 0 - 5 /hpf    Amorphous Crystal Present (A) Absent /hpf    Urine Casts 0-2 0 - 2 /lpf   POCT urinalysis device results    Collection Time: 04/27/25 12:02 PM   Result Value Ref Range    POC Color Dark yellow     POC Appearance Cloudy (A)     POC Glucose 100 (A) Negative mg/dL    POC Ketones 80 (A) Negative mg/dL    POC Specific Gravity 1.025 1.005 - 1.030    POC Blood Large (A) Negative    POC Urine PH 7.0 5.0 - 8.0    POC Protein 100 (A) Negative mg/dL    Bilirubin Small (A) Negative    Urobilinogen, Urine 4.0 Negative    POC Nitrites Negative Negative    POC Leukocyte Esterase Negative Negative   CBC WITH DIFFERENTIAL    Collection Time: 04/27/25 12:40 PM    Result Value Ref Range    WBC 15.0 (H) 4.8 - 10.8 K/uL    RBC 3.85 (L) 4.20 - 5.40 M/uL    Hemoglobin 12.5 12.0 - 16.0 g/dL    Hematocrit 36.2 (L) 37.0 - 47.0 %    MCV 94.0 81.4 - 97.8 fL    MCH 32.5 27.0 - 33.0 pg    MCHC 34.5 32.2 - 35.5 g/dL    RDW 44.3 35.9 - 50.0 fL    Platelet Count 247 164 - 446 K/uL    MPV 10.1 9.0 - 12.9 fL    Neutrophils-Polys 89.40 (H) 44.00 - 72.00 %    Lymphocytes 5.90 (L) 22.00 - 41.00 %    Monocytes 3.90 0.00 - 13.40 %    Eosinophils 0.10 0.00 - 6.90 %    Basophils 0.10 0.00 - 1.80 %    Immature Granulocytes 0.60 0.00 - 0.90 %    Nucleated RBC 0.00 0.00 - 0.20 /100 WBC    Neutrophils (Absolute) 13.42 (H) 1.82 - 7.42 K/uL    Lymphs (Absolute) 0.89 (L) 1.00 - 4.80 K/uL    Monos (Absolute) 0.58 0.00 - 0.85 K/uL    Eos (Absolute) 0.02 0.00 - 0.51 K/uL    Baso (Absolute) 0.02 0.00 - 0.12 K/uL    Immature Granulocytes (abs) 0.09 0.00 - 0.11 K/uL    NRBC (Absolute) 0.00 K/uL   HEP B SURFACE ANTIGEN    Collection Time: 04/27/25 12:40 PM   Result Value Ref Range    Hepatitis B Surface Antigen Non-Reactive Non-Reactive   T.PALLIDUM AB FORTINO (SCREENING)    Collection Time: 04/27/25 12:40 PM   Result Value Ref Range    Syphilis, Treponemal Qual Non-Reactive Non-Reactive   RUBELLA ABS IGG    Collection Time: 04/27/25 12:40 PM   Result Value Ref Range    Rubella IgG Antibody 140.00 IU/mL   HIV AG/AB COMBO ASSAY DIAGNOSTIC    Collection Time: 04/27/25 12:40 PM   Result Value Ref Range    HIV Ag/Ab Combo Assay Non-Reactive Non Reactive     Ultrasounds:  > US in ED on 4/27/25: NILS 14.57 cm. CL 3.09 cm. Vertex.  bpm. Bilateral fetal renal pyelectacsis with AP diameter of renal pelvis measuring 5.3 mm on right and 5.4 mm on left. Otherwise, wnl.   > Detailed fetal anatomy US done on 4/11/25 with [facility]:  g. 39%. CL 4.80 cm. Breech. Fetal growth wnl. Hypoplastic nasal bone.  bpm. Posterior placenta. Recommend repat US in 10 wks for fetal growth due to previous child with Mobius  Syndrome and tobacco use in in pregnancy. Scheduled for 25.  > US on 3/14/25:  g. Anterior placenta. Hypoplastic nasal bone noted, otherwise, normal fetal anatomic survey.  TDaP: To be offered after 27w0d.  Flu: Declines, education provided  RSV: Educated on RSV vaccine at 32-36w  GBS: To be done in 36 wks.   Rh: positive (O positive)  1 hr GTT: To be done with midtrimester labs, ordered today . To be done in 4 weeks prior to next appt.   Genetic testing: Panorama - low risk. NIPT low risk.   STI testing: negative GC/CT, neg RPR.  PAP: 2/3/25 - LSIL. HPV not tested. Dr. Aranda recommended f/u with colposcopy in second trimester. However, per Dr. Hernandez/ACOG guidelines, colpo not recommended and instead, follow up with repeat Pap at 1 year. Patient is aware of this.      A/P:  24 y.o.  at 22w6d presents for routine obstetric follow-up.  Size equals dates and/or scan  Assessment & Plan  Supervision of high-risk pregnancy, second trimester  - S/sx pregnancy, pre E, and  labor warning signs vs general discomforts discussed  - Fetal movements reviewed. Start kick counts at 28 wks.  - Adequate hydration reinforced. Aim for drinking 8-12 cups (64-96 ounces) of water daily.   - Nutrition/exercise/vitamin education; continue PNV  - Encouraged tour of LnD/childbirth education classes: contact info provided   - Anticipatory guidance given.   - Abdominal pain has resolved. Nausea/vomiting have significantly improved. Able to tolerate liquids now. Plans to try solid food later tonight and will be picking up Zofran from pharmacy later today. Urine culture from ER still pending. Reviewed ER precautions.   - Midtrimester labs ordered today, to be done in 4 weeks (can have it done a few days prior to next appointment).  Orders:    GLUCOSE 1HR GESTATIONAL; Future    T.PALLIDUM AB FORTINO (SCREENING); Future    CBC WITHOUT DIFFERENTIAL; Future    Abnormal fetal ultrasound  - US in ED on 25: NILS 14.57 cm. CL  3.09 cm. Vertex.  bpm. Bilateral fetal renal pyelectacsis with AP diameter of renal pelvis measuring 5.3 mm on right and 5.4 mm on left. Otherwise, wnl. Contacted [facility] today to inform them about patient's ER visit and US finding. They plan to reach out to patient to schedule her for a follow-up US and to discuss results in depth.   - Detailed fetal anatomy US done on 4/11/25 with [facility]:  g. 39%. CL 4.80 cm. Breech. Fetal growth wnl. Hypoplastic nasal bone.  bpm. Posterior placenta. Recommend repat US in 10 wks for fetal growth due to previous child with Mobius Syndrome and tobacco use in in pregnancy. Scheduled for 7/2/25.  - US on 3/14/25:  g. Anterior placenta. Hypoplastic nasal bone noted, otherwise, normal fetal anatomic survey.        Pregnancy complicated by history of child with Moebius syndrome  - Established with [facility]. NIPT done, low risk.        Bipolar disorder in full remission, most recent episode unspecified type (HCC)  - She is established with psychiatry who had recommended switching her to Lyrica for Bipoloar. However, patient is not taking at this time as she feels stable without medication and denies any intrusive thoughts.   - Patient continues to follow up with psychiatrist and therapy consistently.        Maternal tobacco use in second trimester  - Patient aware of risks associated with tobacco use/vaping in pregnancy and as of 22 weeks, is no longer vaping at all.   - Scheduled for third trimester US with [facility].       LGSIL on Pap smear of cervix  - PAP: 2/3/25 - LSIL. HPV not tested. Per Dr. Aranda, recommend f/u with colposcopy in second trimester. However, per Dr. Hernandez and ACOG guidelines, colpo not recommended and instead, follow up with repeat Pap at 1 year (due 2/3/2026). Patient is aware of this.        Marijuana use during pregnancy  - Patient uses marijuana on occasion as she feels it is the only thing that helps with her nausea. Her nausea has significantly  improved and so she has now decreased her marijuana use to once a day or less.   - She is aware of risks associated with marijuana in pregnancy.and continues to decrease use of it. Encouraged cessation.       Follow-up in 4 weeks with physician     Melba Leiva P.A.-C.  [facility]

## 2025-04-28 NOTE — ASSESSMENT & PLAN NOTE
- She is established with psychiatry who had recommended switching her to Lyrica for Bipoloar. However, patient is not taking at this time as she feels stable without medication and denies any intrusive thoughts.   - Patient continues to follow up with psychiatrist and therapy consistently.

## 2025-04-28 NOTE — PROGRESS NOTES
Pt here for OB follow-up  Last seen on: 3/31/25 Melba   +FM  No VB, LOF, UC  Phone: [phone]   Pharmacy verified   Rh type: O (+)  Pt states yesterday at 7am she woke up with super bad abdominal pains, she found that sitting in a hot bath typically helped. ED did state that it might be a renal stone. She has been very nauseated and has not been able to keep food down    Genetic testing results: low risk (male) ~do not share gender    High Risk Pregnancy [facility]: anterior placenta implantation grade II    Labs:  Has completed some labs with ED. Please review     US:  Completed with [facility] on 4/11 request has been sent over.    Vaccines:  Tdap: offer at 27 weeks  FLU: declined 3/3/2025  RSV: no longer indicated       -ED visit 4/27/2025: abdominal pain felt similar to contractions. Normal fetal activity noted   - possible renal stone that passed, UA was positive for large blood

## 2025-04-28 NOTE — ED PROVIDER NOTES
Emergency Obstetric Consultation     Date of Service  2025    Reason for Consultation  No chief complaint on file.      History of Presenting Illness  24 y.o. female who presented 2025 with Reason for consult: Pt here c/o incr lower abd pain, now with vomiting. Pt was seen this morning for this pain - had full workup with suspicion of kidney stones - urine culture pending. Pt felt better after IVF. Pt denies UCs, VB, LOF. +FM. Pt denies fever, chills. Extensive THC history, Bipolar d/o and non-compliance with appts.   Fetal activity: Perceived fetal activity is normal.      .    Review of Systems  Review of Systems   Constitutional:  Negative for chills and fever.   Psychiatric/Behavioral:  Positive for substance abuse.    All other systems reviewed and are negative.      Obstetric History    OB History    Para Term  AB Living   2 1 1   1   SAB IAB Ectopic Molar Multiple Live Births        1      # Outcome Date GA Lbr Nael/2nd Weight Sex Type Anes PTL Lv   2 Current            1 Term 10/20/21 40w0d   F Vag-Spont   CHRISTI       Gynecologic History  Patient's last menstrual period was 2024.    Medical History  Past Medical History:   Diagnosis Date    Bilateral pneumothoraces 08/10/2024    No contraindication to deep vein thrombosis (DVT) prophylaxis 08/10/2024    Trauma 08/10/2024       Surgical History   has a past surgical history that includes other orthopedic surgery.    Family History  family history is not on file.    Social History   reports that she has never smoked. She does not have any smokeless tobacco history on file. She reports that she does not drink alcohol and does not use drugs.    Medications  Medications Prior to Admission   Medication Sig Dispense Refill Last Dose/Taking    Prenatal Multivit-Min-Fe-FA (PRENATAL 1 + IRON PO) Take  by mouth.       acetaminophen (TYLENOL) 500 MG Tab Take 2 Tablets by mouth every 6 hours as needed for Mild Pain.     "      Allergies  No Known Allergies    Physical Exam  Baseline rate: 130bpm.   +FHT by Doppler at 130bpm        Laboratory  Recent Labs     04/27/25  1240   WBC 15.0*   RBC 3.85*   HEMOGLOBIN 12.5   HEMATOCRIT 36.2*   MCV 94.0   MCH 32.5   MCHC 34.5   RDW 44.3   PLATELETCT 247   MPV 10.1              No results for input(s): \"NTPROBNP\" in the last 72 hours.         Imaging  None    Assessment & Plan  Assessment:  Not in labor.   Membrane status: intact.   Fetal well-being: normal.     Plan:  IUP at 22w5d with abd pain - Pt declines IVF and Zofran though Tylenol given for pain. Urine culture pending - will repeat labs if pt has fever or worsening symptoms, pt aware. Pt has f/u appt [facility] 4/28. Missed Gyn Colpo appt 4/22. Has f/u with [facility] for absent nasal bone. Work note and Zofran called in today.          Candice Redmond, PJerryA.    "

## 2025-04-28 NOTE — PROGRESS NOTES
"2325 Patient is a  at 22weeks and 5days, EDC of . Arrived to unit and placed in LDA 4, RN placed EFM and toco to ensure fetal well being and monitor uterine activity. RN educated patient on need for monitors and patient verbalized understanding. Vital signs taken. Available prenatal labs / records reviewed by RN. Patient's chief complaint \"I am throwing up again and the pain is back\".  Patient denies leaking or vaginal bleeding, reports positive fetal movement.   2330 Candice GOLDEN notified of patient arrival, report given with an MFTI of 3,  & para reviewed, bps, fhr, contraction pattern reviewed, tylenol, ivf and zofran ordered.  2337 Patient declines IV and zofran, desires tylenol.   2345 Report given to Elizabeth, david relinquished.   "

## 2025-04-28 NOTE — ASSESSMENT & PLAN NOTE
- PAP: 2/3/25 - LSIL. HPV not tested. Per Dr. Aranda, recommend f/u with colposcopy in second trimester. However, per Dr. Hernandez and ACOG guidelines, colpo not recommended and instead, follow up with repeat Pap at 1 year (due 2/3/2026). Patient is aware of this.

## 2025-04-28 NOTE — ASSESSMENT & PLAN NOTE
- Patient aware of risks associated with tobacco use/vaping in pregnancy and as of 22 weeks, is no longer vaping at all.   - [facility]

## 2025-04-28 NOTE — ASSESSMENT & PLAN NOTE
- Patient uses marijuana on occasion as she feels it is the only thing that helps with her nausea. Her nausea has significantly improved and so she has now decreased her marijuana use to once a day or less.   - She is aware of risks associated with marijuana in pregnancy.and continues to decrease use of it. Encouraged cessation.

## 2025-04-28 NOTE — ASSESSMENT & PLAN NOTE
- S/sx pregnancy, pre E, and  labor warning signs vs general discomforts discussed  - Fetal movements reviewed. Start kick counts at 28 wks.  - Adequate hydration reinforced. Aim for drinking 8-12 cups (64-96 ounces) of water daily.   - Nutrition/exercise/vitamin education; continue PNV  - Encouraged tour of LnD/childbirth education classes: contact info provided   - Anticipatory guidance given.   - Abdominal pain has resolved. Nausea/vomiting have significantly improved. Able to tolerate liquids now. Plans to try solid food later tonight and will be picking up Zofran from pharmacy later today. Urine culture from ER still pending. Reviewed ER precautions.   - Midtrimester labs ordered today, to be done in 4 weeks (can have it done a few days prior to next appointment).  Orders:    GLUCOSE 1HR GESTATIONAL; Future    T.PALLIDUM AB FORTINO (SCREENING); Future    CBC WITHOUT DIFFERENTIAL; Future

## 2025-04-28 NOTE — ASSESSMENT & PLAN NOTE
- US in ED on 4/27/25: NILS 14.57 cm. CL 3.09 cm. Vertex.  bpm. Bilateral fetal renal pyelectacsis with AP diameter of renal pelvis measuring 5.3 mm on right and 5.4 mm on left. Otherwise, wnl. Contacted [facility] today to inform them about patient's ER visit and US finding. They plan to reach out to patient to schedule her for a follow-up US and to discuss results in depth.   - Detailed fetal anatomy US done on 4/11/25 with [facility]:  g. 39%. CL 4.80 cm. Breech. Fetal growth wnl. Hypoplastic nasal bone.  bpm. Posterior placenta. Recommend repat US in 10 wks for fetal growth due to previous child with Mobius Syndrome and tobacco use in in pregnancy. Scheduled for 7/2/25.  - US on 3/14/25:  g. Anterior placenta. Hypoplastic nasal bone noted, otherwise, normal fetal anatomic survey.

## 2025-04-29 LAB
BACTERIA UR CULT: NORMAL
SIGNIFICANT IND 70042: NORMAL
SITE SITE: NORMAL
SOURCE SOURCE: NORMAL

## 2025-05-24 NOTE — PROGRESS NOTES
[facility]  RETURN OB VISIT    S: Pt is a 24 y.o.  at 27w0d  gestation here today for routine prenatal care.     Pregnancy complicated by:  Problem List[1]    Denies concerns today. Denies contractions, loss of fluid, and vaginal bleeding. Fetal movement present.     O: /66   Wt 140 lb   LMP 2024   BMI 20.67 kg/m²    *see prenatal flowsheet*     Physical Exam:  Gen: no acute distress  Pulm: easy work of breathing on room air  Abd: gravid, nontender    Labs:  Prenatal labs: Completed and normal  GS/GTT: ordered   GBS: Not yet collected  Genetic testing: low risk NIPT  STI testing: negative    Ultrasound:  - US : NILS 14.57 cm. CL 3.09 cm. Vertex.  bpm. Bilateral fetal renal pyelectacsis with AP diameter of renal pelvis measuring 5.3 mm on right and 5.4 mm on left. Otherwise, wnl.   - US 3/14:  g. Anterior placenta. Hypoplastic nasal bone noted, otherwise, normal fetal anatomic survey.   - US  Detailed fetal anatomy with [facility]:  g. 39%. CL 4.80 cm. Breech. Fetal growth wnl. Hypoplastic nasal bone.  bpm. Posterior placenta. Recommend repeat US in 10 wks for fetal growth due to previous child with Mobius Syndrome and tobacco use in in pregnancy. Scheduled for 25.    Vaccinations:  Flu: declined  Tdap: today  RSV: 32-36wks from Sept-    A/P: 24 y.o.  with IUP at 27w0d who presents for return OB visit    #PNC  - [place] location and precautions reviewed  - Anticipatory guidance provided  - Size equals Dates  - Contraception: Copper IUD at 6wk PPV, previously had and enjoyed  - Tdap today  - CBC/STP/GS ordered , planning to obtain this week     #Fetus  #Prior child with Moebius Syndrome  - Followed by [facility]  - Anatomy US with hypoplastic nasal bone    #Bipolar Disorder  - Follows with Psychiatrist and therpaist  - No current meds, previously on Lyrica    #Marijuana Use  #Tobacco Use  - Previously counseled regarding cessation    #LSIL Pap  Smear  - Due for repeat in Feb 2026    Kendal Shankar M.D.         [1]   Patient Active Problem List  Diagnosis    Supervision of high-risk pregnancy, second trimester    Maternal tobacco use in second trimester    Marijuana use during pregnancy    Bipolar disorder in full remission (HCC)    Pregnancy complicated by history of child with Moebius syndrome    LGSIL on Pap smear of cervix    Lower abdominal pain    Abnormal fetal ultrasound

## 2025-05-27 ENCOUNTER — ROUTINE PRENATAL (OUTPATIENT)
Dept: OBGYN | Facility: CLINIC | Age: 25
End: 2025-05-27
Payer: COMMERCIAL

## 2025-05-27 VITALS — BODY MASS INDEX: 20.67 KG/M2 | SYSTOLIC BLOOD PRESSURE: 123 MMHG | WEIGHT: 140 LBS | DIASTOLIC BLOOD PRESSURE: 66 MMHG

## 2025-05-27 DIAGNOSIS — O28.3 ABNORMAL FETAL ULTRASOUND: ICD-10-CM

## 2025-05-27 DIAGNOSIS — O09.92 SUPERVISION OF HIGH-RISK PREGNANCY, SECOND TRIMESTER: Primary | ICD-10-CM

## 2025-05-27 DIAGNOSIS — O99.891: ICD-10-CM

## 2025-05-27 DIAGNOSIS — R87.612 LGSIL ON PAP SMEAR OF CERVIX: ICD-10-CM

## 2025-05-27 PROCEDURE — 90471 IMMUNIZATION ADMIN: CPT | Performed by: STUDENT IN AN ORGANIZED HEALTH CARE EDUCATION/TRAINING PROGRAM

## 2025-05-27 PROCEDURE — 0502F SUBSEQUENT PRENATAL CARE: CPT | Performed by: STUDENT IN AN ORGANIZED HEALTH CARE EDUCATION/TRAINING PROGRAM

## 2025-05-27 PROCEDURE — 90715 TDAP VACCINE 7 YRS/> IM: CPT | Mod: JZ | Performed by: STUDENT IN AN ORGANIZED HEALTH CARE EDUCATION/TRAINING PROGRAM

## 2025-05-27 ASSESSMENT — FIBROSIS 4 INDEX: FIB4 SCORE: 0.48

## 2025-05-27 NOTE — PROGRESS NOTES
OB follow up   + fetal movement.  No VB, LOF or UC's.  Phone # [phone]  Preferred pharmacy confirmed.  Tdap given   HERON given

## 2025-05-27 NOTE — LETTER
"Count Your Baby's Movements  Another step to a healthy delivery    Kaylyn Tessa Peña  [facility]  Dept: [phone]    How Many Weeks Pregnant? 27w0d    Date to Begin Countin2025              How to use this chart    One way for your physician to keep track of your baby's health is by knowing how often the baby moves (or \"kicks\") in your womb.  You can help your physician to do this by using this chart every day.    Every day, you should see how many hours it takes for your baby to move 10 times.  Start in the morning, as soon as you get up.    First, write down the time your baby moves until you get to 10.  Check off one box every time your baby moves until you get to 10.  Write down the time you finished counting in the last column.  Total how long it took to count up all 10 movements.  Finally, fill in the box that shows how long this took.  After counting 10 movements, you no longer have to count any more that day.  The next morning, just start counting again as soon as you get up.    What should you call a \"movement\"?  It is hard to say, because it will feel different from one mother to another and from one pregnancy to the next.  The important thing is that you count the movements the same way throughout your pregnancy.  If you have more questions, you should ask your physician.    Count carefully every day!  SAMPLE:  Week 28    How many hours did it take to feel 10 movements?       Start  Time     1     2     3     4     5     6     7     8     9     10   Finish Time   Mon 8:20           11:40                  Fri               Sat               Sun                 IMPORTANT: You should contact your physician if it takes more than two hours for you to feel 10 movements.  Each morning, write down the time and start to count the movements of your baby.  Keep track by checking off one box every time you feel one movement.  When you have " "felt 10 \"kicks\", write down the time you finished counting in the last column.  Then fill in the   box (over the check carlos a) for the number of hours it took.  Be sure to read the complete instructions on the previous page.          "

## 2025-05-27 NOTE — Clinical Note
"Count Your Baby's Movements  Another step to a healthy delivery    Kaylyn Tessa Peña  [facility]  Dept: [phone]    How Many Weeks Pregnant? 27w0d    Date to Begin Counting: ***              How to use this chart    One way for your physician to keep track of your baby's health is by knowing how often the baby moves (or \"kicks\") in your womb.  You can help your physician to do this by using this chart every day.    Every day, you should see how many hours it takes for your baby to move 10 times.  Start in the morning, as soon as you get up.    · First, write down the time your baby moves until you get to 10.  · Check off one box every time your baby moves until you get to 10.  · Write down the time you finished counting in the last column.  · Total how long it took to count up all 10 movements.  · Finally, fill in the box that shows how long this took.  After counting 10 movements, you no longer have to count any more that day.  The next morning, just start counting again as soon as you get up.    What should you call a \"movement\"?  It is hard to say, because it will feel different from one mother to another and from one pregnancy to the next.  The important thing is that you count the movements the same way throughout your pregnancy.  If you have more questions, you should ask your physician.    Count carefully every day!  SAMPLE:  Week 28    How many hours did it take to feel 10 movements?       Start  Time     1     2     3     4     5     6     7     8     9     10   Finish Time   Mon 8:20 ·  ·  ·  ·  ·  ·  ·  ·  ·  ·  11:40   Tue               Wed               Thu               Fri               Sat               Sun                 IMPORTANT: You should contact your physician if it takes more than two hours for you to feel 10 movements.  Each morning, write down the time and start to count the movements of your baby.  Keep track by checking off one box every time you feel one " "movement.  When you have felt 10 \"kicks\", write down the time you finished counting in the last column.  Then fill in the   box (over the check carlos a) for the number of hours it took.  Be sure to read the complete instructions on the previous page.          "

## 2025-06-13 ENCOUNTER — APPOINTMENT (OUTPATIENT)
Dept: OBGYN | Facility: CLINIC | Age: 25
End: 2025-06-13
Payer: COMMERCIAL

## 2025-06-16 ENCOUNTER — ROUTINE PRENATAL (OUTPATIENT)
Dept: OBGYN | Facility: CLINIC | Age: 25
End: 2025-06-16
Payer: COMMERCIAL

## 2025-06-16 VITALS
HEART RATE: 88 BPM | WEIGHT: 148 LBS | BODY MASS INDEX: 21.86 KG/M2 | SYSTOLIC BLOOD PRESSURE: 99 MMHG | DIASTOLIC BLOOD PRESSURE: 52 MMHG

## 2025-06-16 DIAGNOSIS — O09.92 SUPERVISION OF HIGH-RISK PREGNANCY, SECOND TRIMESTER: Primary | ICD-10-CM

## 2025-06-16 PROCEDURE — 3078F DIAST BP <80 MM HG: CPT | Performed by: OBSTETRICS & GYNECOLOGY

## 2025-06-16 PROCEDURE — 0502F SUBSEQUENT PRENATAL CARE: CPT | Performed by: OBSTETRICS & GYNECOLOGY

## 2025-06-16 PROCEDURE — 3074F SYST BP LT 130 MM HG: CPT | Performed by: OBSTETRICS & GYNECOLOGY

## 2025-06-16 ASSESSMENT — FIBROSIS 4 INDEX: FIB4 SCORE: 0.5

## 2025-06-16 NOTE — PROGRESS NOTES
OB Visit Note - 29w6d     MEDICAL DECISION MAKING:  Kaylyn Peña is a 25 y.o. female  at 29w6d    Today's visit addressed:   Doing well, no complaints. No CTX, VB or LOF. Good FM.     Pregnancy complicated by:  Problem List[1]  Hx prior pregnancy with Mobius syndrome - next US with MFM .   Hypoplastic nasal bone, fetal pyelectasis - f/u with MFM.     Advised to do 1h GTT/mid pregnancy labs asap; patient states she will complete this week.     The patient will follow up in 2 week(s). She was counseled to call or return for vaginal bleeding, regular contractions, leakage of fluid or decreased fetal movement. Daily kick counts.     Demarcus Gardner M.D.        [1]   Patient Active Problem List  Diagnosis    Supervision of high-risk pregnancy, second trimester    Maternal tobacco use in second trimester    Marijuana use during pregnancy    Bipolar disorder in full remission (HCC)    Pregnancy complicated by history of child with Moebius syndrome    LGSIL on Pap smear of cervix    Lower abdominal pain    Abnormal fetal ultrasound

## 2025-06-16 NOTE — PROGRESS NOTES
OB follow up. Pt states no concerns.    LABS not completed! Pt states she will get scheduled today. Pt given lab sheets.    Still deciding on BTL.     + fetal movement.  No VB, LOF or UC's.    Phone # [phone]  Preferred pharmacy confirmed.

## 2025-06-27 ENCOUNTER — ROUTINE PRENATAL (OUTPATIENT)
Dept: OBGYN | Facility: CLINIC | Age: 25
End: 2025-06-27
Payer: COMMERCIAL

## 2025-06-27 VITALS
DIASTOLIC BLOOD PRESSURE: 71 MMHG | HEART RATE: 90 BPM | WEIGHT: 147.9 LBS | SYSTOLIC BLOOD PRESSURE: 116 MMHG | BODY MASS INDEX: 21.84 KG/M2

## 2025-06-27 DIAGNOSIS — O09.93 HIGH RISK FOR INTRAPARTUM COMPLICATIONS IN THIRD TRIMESTER: Primary | ICD-10-CM

## 2025-06-27 PROCEDURE — 0502F SUBSEQUENT PRENATAL CARE: CPT | Performed by: STUDENT IN AN ORGANIZED HEALTH CARE EDUCATION/TRAINING PROGRAM

## 2025-06-27 PROCEDURE — 3074F SYST BP LT 130 MM HG: CPT | Performed by: STUDENT IN AN ORGANIZED HEALTH CARE EDUCATION/TRAINING PROGRAM

## 2025-06-27 PROCEDURE — 3078F DIAST BP <80 MM HG: CPT | Performed by: STUDENT IN AN ORGANIZED HEALTH CARE EDUCATION/TRAINING PROGRAM

## 2025-06-27 ASSESSMENT — FIBROSIS 4 INDEX: FIB4 SCORE: 0.5

## 2025-06-27 NOTE — PROGRESS NOTES
OB Visit Note - 31w3d     Pregnancy complicated by:  Problem List[1]      SUBJECTIVE:  Kaylyn Peña is a 25 y.o.,  at 31w3d who presents for pregnancy follow-up. . She states the baby is moving. Will do growth and NST with [facility] on 25. Also will do 1hr GTT at [facility] at her next appt.     No concerns today. Feeling well.     ROS:  She denies vaginal bleeding, leakage of fluid, or contractions.    She denies nausea or vomiting or headache    OBJECTIVE:  Vital Signs: /71   Pulse 90   Wt 147 lb 14.4 oz   LMP 2024   BMI 21.84 kg/m²   Appearance/Psychiatric: to be in no distress  Constitutional: The patient is well nourished.  Respiratory:Respiratory effort is normal.  Gastrointestinal: Abdomen is soft, gravid, non-tendern,no rashes, heart tones are present, fundal height is consistent with dates  Extremities: no edema  FH 30      1. High risk for intrapartum complications in third trimester          Kaylyn Peña is a 25 y.o. female  at 31w3d here for JOJO. Doing well. Has growth, NST and 1hr GTT scheduled on 25      The patient will follow up in 2 week(s). She was counseled to call or return for vaginal bleeding, regular contractions, leakage of fluid or decreased fetal movement.  Annette Hernandez DO, FACOG  [facility]           [1]   Patient Active Problem List  Diagnosis    Supervision of high-risk pregnancy, second trimester    Maternal tobacco use in second trimester    Marijuana use during pregnancy    Bipolar disorder in full remission (HCC)    Pregnancy complicated by history of child with Moebius syndrome    LGSIL on Pap smear of cervix    Lower abdominal pain    Abnormal fetal ultrasound

## 2025-06-27 NOTE — PROGRESS NOTES
Pt reports regular fetal movement    Labs: Has labs scheduled for 7/2/2025    Ultrasound/Anatomy Scan: Requesting ultrasound  Phone: [phone]   Pharmacy: [facility]  Allergies and medications confirmed with pt - OB [facility]V

## 2025-07-02 ENCOUNTER — APPOINTMENT (OUTPATIENT)
Dept: RADIOLOGY | Facility: MEDICAL CENTER | Age: 25
End: 2025-07-02
Attending: FAMILY MEDICINE
Payer: COMMERCIAL

## 2025-07-02 ENCOUNTER — HOSPITAL ENCOUNTER (OUTPATIENT)
Facility: MEDICAL CENTER | Age: 25
End: 2025-07-02
Attending: STUDENT IN AN ORGANIZED HEALTH CARE EDUCATION/TRAINING PROGRAM | Admitting: OBSTETRICS & GYNECOLOGY
Payer: COMMERCIAL

## 2025-07-02 ENCOUNTER — HOSPITAL ENCOUNTER (EMERGENCY)
Facility: MEDICAL CENTER | Age: 25
End: 2025-07-02
Attending: OBSTETRICS & GYNECOLOGY | Admitting: OBSTETRICS & GYNECOLOGY
Payer: COMMERCIAL

## 2025-07-02 VITALS
HEIGHT: 69 IN | OXYGEN SATURATION: 98 % | HEART RATE: 89 BPM | TEMPERATURE: 97.4 F | DIASTOLIC BLOOD PRESSURE: 58 MMHG | RESPIRATION RATE: 16 BRPM | WEIGHT: 148.2 LBS | BODY MASS INDEX: 21.95 KG/M2 | SYSTOLIC BLOOD PRESSURE: 111 MMHG

## 2025-07-02 DIAGNOSIS — O09.92 SUPERVISION OF HIGH-RISK PREGNANCY, SECOND TRIMESTER: ICD-10-CM

## 2025-07-02 LAB
ERYTHROCYTE [DISTWIDTH] IN BLOOD BY AUTOMATED COUNT: 47.5 FL (ref 35.9–50)
HCT VFR BLD AUTO: 33.4 % (ref 37–47)
HCV AB SER QL: NORMAL
HGB BLD-MCNC: 10.6 G/DL (ref 12–16)
MCH RBC QN AUTO: 31.4 PG (ref 27–33)
MCHC RBC AUTO-ENTMCNC: 31.7 G/DL (ref 32.2–35.5)
MCV RBC AUTO: 98.8 FL (ref 81.4–97.8)
PLATELET # BLD AUTO: 262 K/UL (ref 164–446)
PMV BLD AUTO: 10.9 FL (ref 9–12.9)
RBC # BLD AUTO: 3.38 M/UL (ref 4.2–5.4)
T PALLIDUM AB SER QL IA: NORMAL
WBC # BLD AUTO: 9.2 K/UL (ref 4.8–10.8)

## 2025-07-02 PROCEDURE — 86787 VARICELLA-ZOSTER ANTIBODY: CPT

## 2025-07-02 PROCEDURE — 99282 EMERGENCY DEPT VISIT SF MDM: CPT | Mod: 25

## 2025-07-02 PROCEDURE — 86706 HEP B SURFACE ANTIBODY: CPT

## 2025-07-02 PROCEDURE — 86780 TREPONEMA PALLIDUM: CPT

## 2025-07-02 PROCEDURE — 99283 EMERGENCY DEPT VISIT LOW MDM: CPT | Performed by: FAMILY MEDICINE

## 2025-07-02 PROCEDURE — 86803 HEPATITIS C AB TEST: CPT

## 2025-07-02 PROCEDURE — 76819 FETAL BIOPHYS PROFIL W/O NST: CPT

## 2025-07-02 PROCEDURE — 59025 FETAL NON-STRESS TEST: CPT | Mod: XU

## 2025-07-02 PROCEDURE — 86704 HEP B CORE ANTIBODY TOTAL: CPT

## 2025-07-02 PROCEDURE — 83036 HEMOGLOBIN GLYCOSYLATED A1C: CPT

## 2025-07-02 PROCEDURE — 85027 COMPLETE CBC AUTOMATED: CPT

## 2025-07-02 ASSESSMENT — FIBROSIS 4 INDEX: FIB4 SCORE: 0.5

## 2025-07-02 ASSESSMENT — PAIN SCALES - GENERAL: PAINLEVEL: 0 - NO PAIN

## 2025-07-02 NOTE — ED PROVIDER NOTES
OB Triage/ED Evaluation Note    Kaylyn Peña is a 25 y.o. female  at 32w1d by LMP c/w 11w US who presents for variable decelerations on NST in clinic.    CC:   Chief Complaint   Patient presents with    Non-Stress Test     Subjective:   Pt feels well and has no complaints. She was at [facility] appointment today for NST and US and had variable decelerations during NST. [facility] sent the patient in for evaluation. NST in triage was reassuring and consistent with gestational age. BPP  as well.    Uterine contractions denies  Leakage of fluid denies  vaginal bleeding denies  fetal movement affirms    ROS:  GEN: denies fever, chills  HEENT: denies headache, denies blurry vision  CV: denies chest pain or palpitations, BLE edema -  RESP: denies chest pain or shortness of breath  ABD: denies RUQ pain  : denies dysuria      I personally reviewed the past medical and surgical histories, as well as the problem list.    Prenatal care with [facility] starting at 11wk with following problems:  Patient Active Problem List    Diagnosis Date Noted    Abnormal fetal ultrasound 2025    Lower abdominal pain 2025    Supervision of high-risk pregnancy, second trimester 2025    Maternal tobacco use in second trimester 2025    Marijuana use during pregnancy 2025    Bipolar disorder in full remission (HCC) 2025    Pregnancy complicated by history of child with Moebius syndrome 2025    LGSIL on Pap smear of cervix 2025       Past Medical History[1]  Past Surgical History[2]  No family history on file.  OB History    Para Term  AB Living   2 1 1   1   SAB IAB Ectopic Molar Multiple Live Births        1      # Outcome Date GA Lbr Nael/2nd Weight Sex Type Anes PTL Lv   2 Current            1 Term 10/20/21 40w0d   F Vag-Spont   CHRISTI     Social History     Socioeconomic History    Marital status: Single     Spouse name: Not on file    Number of children: Not on file    Years of  education: Not on file    Highest education level: Not on file   Occupational History    Not on file   Tobacco Use    Smoking status: Never    Smokeless tobacco: Not on file   Substance and Sexual Activity    Alcohol use: No    Drug use: No    Sexual activity: Not on file   Other Topics Concern    Not on file   Social History Narrative    Not on file     Social Drivers of Health     Financial Resource Strain: Not on File (11/24/2021)    Received from [employer]    Financial Resource Strain     Financial Resource Strain: 0   Food Insecurity: Not on File (9/26/2024)    Received from [employer]    Food Insecurity     Food: 0   Transportation Needs: Unmet Transportation Needs (8/11/2024)    PRAPARE - Transportation     Lack of Transportation (Medical): Yes     Lack of Transportation (Non-Medical): Yes   Physical Activity: Not on File (11/24/2021)    Received from [employer]    Physical Activity     Physical Activity: 0   Stress: Not on File (11/24/2021)    Received from [employer]    Stress     Stress: 0   Social Connections: Not on File (9/7/2024)    Received from [employer]    Social Connections     Connectedness: 0   Intimate Partner Violence: Not At Risk (8/11/2024)    Humiliation, Afraid, Rape, and Kick questionnaire     Fear of Current or Ex-Partner: No     Emotionally Abused: No     Physically Abused: No     Sexually Abused: No   Housing Stability: Low Risk  (8/11/2024)    Housing Stability Vital Sign     Unable to Pay for Housing in the Last Year: No     Number of Places Lived in the Last Year: 1     Unstable Housing in the Last Year: No     Allergies[3]   Current Medications[4]      Objective:      Vitals:    07/02/25 1144   BP: 111/58   Pulse: 89   Resp: 16   Temp: 36.3 °C (97.4 °F)   SpO2: 98%       GEN: NAD, AAOx3, calm, cooperative, laying comfortably in bed  HEENT: NCAT, EOMI  CV: +S1S2, RRR, - BLE edema, radial pulses 2+  RESP: CTAB, no cough, breathing comfortably on RA  ABS: soft, NTTP, gravid  : no lesions  MSK: moving all  extremities    SVE: defer    FHT: Cat I, baseline 140, variability moderate, +accels, -decels, -UC    BPP 8/8    Lab Review  Lab:   Blood type: O  Recent Results (from the past 35 weeks)   THINPREP RFLX HPV ASCUS W/CTNG    Collection Time: 02/03/25  9:37 AM   Result Value Ref Range    ThinPrep Pap, Cytology SEE BELOW    Chlamydia/GC PCR Assoc.W/Thinprep    Collection Time: 02/03/25  9:37 AM   Result Value Ref Range    C. trachomatis by PCR Negative Negative    N. gonorrhoeae by PCR Negative Negative    Source Cervical    PANORAMA PRENATAL TEST    Collection Time: 03/02/25 12:07 AM   Result Value Ref Range    REPORT SUMMARY LOW RISK     REPORT NOTE See Notes     GENDER OF FETUS Male     FETAL FRACTION 10.8%     TRISOMY 21 RESULT TEXT Low Risk     TRISOMY 21 AGE-BASED RISK TEXT 1/1,140 (0.09%)     TRISOMY 21 RISK SCORE TEXT <1/10,000 (<0.01%)     TRISOMY 18 RESULT TEXT Low Risk     TRISOMY 18 AGE-BASED RISK TEXT 1/3,015 (0.03%)     TRISOMY 18 RISK SCORE TEXT <1/10,000 (<0.01%)     TRISOMY 13 RESULT TEXT Low Risk     TRISOMY 13 AGE-BASED RISK TEXT 1/9,389 (0.01%)     TRISOMY 13 RISK SCORE TEXT <1/10,000 (<0.01%)     MONOSOMY X RESULT TEXT Low Risk     MONOSOMY X AGE-BASED RISK TEXT 1/568 (0.18%)     MONOSOMY X RISK SCORE TEXT <1/10,000 (<0.01%)     TRIPLOIDY RESULT TEXT Low Risk     22Q11.2 DELETION SYNDROME RESULT TEXT Low Risk     22Q11.2 DELETION SYNDROME POPULATION-BASED RISK TEXT 1/2,000     22Q11.2 DELETION SYNDROME RISK SCORE TEXT 1/12,000     1P36 DELETION SYNDROME RESULT TEXT Low Risk     1P36 DELETION SYNDROME POPULATION-BASED RISK TEXT 1/5,000     1P36 DELETION SYNDROME RISK SCORE TEXT 1/12,400     ANGELMAN SYNDROME RESULT TEXT Low Risk     ANGELMAN SYNDROME POPULATION-BASED RISK TEXT 1/12,000     ANGELMAN SYNDROME RISK SCORE TEXT 1/16,600     CRI-DU-CHAT SYNDROME RESULT TEXT Low Risk     CRI-DU-CHAT SYNDROME POPULATION-BASED RISK TEXT 1/20,000     CRI-DU-CHAT SYNDROME RISK SCORE TEXT 1/57,100      PRADER-WILLI SYNDROME RESULT TEXT Low Risk     PRADER-WILLI SYNDROME POPULATION-BASED RISK TEXT 1/10,000     PRADER-WILLI SYNDROME RISK SCORE TEXT 1/13,800     FOOTNOTES See Notes    URINALYSIS    Collection Time: 04/27/25 12:00 PM    Specimen: Urine, Clean Catch   Result Value Ref Range    Color Dark yellow     Character Cloudy (A)     Specific Gravity 1.025 <1.035    Ph 7.0 5.0 - 8.0    Glucose Negative Negative mg/dL    Ketones 80 (A) Negative mg/dL    Protein 30 (A) Negative mg/dL    Bilirubin Small (A) Negative    Urobilinogen, Urine 2.0 (A) <=1.0 EU/dL    Nitrite Negative Negative    Leukocyte Esterase Negative Negative    Occult Blood Moderate (A) Negative    Micro Urine Req Microscopic    URINE CULTURE(NEW)    Collection Time: 04/27/25 12:00 PM    Specimen: Urine, Clean Catch   Result Value Ref Range    Significant Indicator NEG     Source UR     Site URINE, CLEAN CATCH     Culture Result No growth at 48 hours.    URINE MICROSCOPIC (W/UA)    Collection Time: 04/27/25 12:00 PM   Result Value Ref Range    WBC 6-10 (A) /hpf    RBC 21-50 (A) 0 - 2 /hpf    Bacteria None None /hpf    Epithelial Cells 6-10 (A) 0 - 5 /hpf    Amorphous Crystal Present (A) Absent /hpf    Urine Casts 0-2 0 - 2 /lpf   POCT urinalysis device results    Collection Time: 04/27/25 12:02 PM   Result Value Ref Range    POC Color Dark yellow     POC Appearance Cloudy (A)     POC Glucose 100 (A) Negative mg/dL    POC Ketones 80 (A) Negative mg/dL    POC Specific Gravity 1.025 1.005 - 1.030    POC Blood Large (A) Negative    POC Urine PH 7.0 5.0 - 8.0    POC Protein 100 (A) Negative mg/dL    Bilirubin Small (A) Negative    Urobilinogen, Urine 4.0 Negative    POC Nitrites Negative Negative    POC Leukocyte Esterase Negative Negative   CBC WITH DIFFERENTIAL    Collection Time: 04/27/25 12:40 PM   Result Value Ref Range    WBC 15.0 (H) 4.8 - 10.8 K/uL    RBC 3.85 (L) 4.20 - 5.40 M/uL    Hemoglobin 12.5 12.0 - 16.0 g/dL    Hematocrit 36.2 (L) 37.0 -  47.0 %    MCV 94.0 81.4 - 97.8 fL    MCH 32.5 27.0 - 33.0 pg    MCHC 34.5 32.2 - 35.5 g/dL    RDW 44.3 35.9 - 50.0 fL    Platelet Count 247 164 - 446 K/uL    MPV 10.1 9.0 - 12.9 fL    Neutrophils-Polys 89.40 (H) 44.00 - 72.00 %    Lymphocytes 5.90 (L) 22.00 - 41.00 %    Monocytes 3.90 0.00 - 13.40 %    Eosinophils 0.10 0.00 - 6.90 %    Basophils 0.10 0.00 - 1.80 %    Immature Granulocytes 0.60 0.00 - 0.90 %    Nucleated RBC 0.00 0.00 - 0.20 /100 WBC    Neutrophils (Absolute) 13.42 (H) 1.82 - 7.42 K/uL    Lymphs (Absolute) 0.89 (L) 1.00 - 4.80 K/uL    Monos (Absolute) 0.58 0.00 - 0.85 K/uL    Eos (Absolute) 0.02 0.00 - 0.51 K/uL    Baso (Absolute) 0.02 0.00 - 0.12 K/uL    Immature Granulocytes (abs) 0.09 0.00 - 0.11 K/uL    NRBC (Absolute) 0.00 K/uL   HEP B SURFACE ANTIGEN    Collection Time: 25 12:40 PM   Result Value Ref Range    Hepatitis B Surface Antigen Non-Reactive Non-Reactive   T.PALLIDUM AB FORTINO (SCREENING)    Collection Time: 25 12:40 PM   Result Value Ref Range    Syphilis, Treponemal Qual Non-Reactive Non-Reactive   RUBELLA ABS IGG    Collection Time: 25 12:40 PM   Result Value Ref Range    Rubella IgG Antibody 140.00 IU/mL   HIV AG/AB COMBO ASSAY DIAGNOSTIC    Collection Time: 25 12:40 PM   Result Value Ref Range    HIV Ag/Ab Combo Assay Non-Reactive Non Reactive     Recent Labs     25  1240   HEMOGLOBIN 12.5   PLATELETCT 247   RUBELLAIGG 140.00   HEPBSAG Non-Reactive           Assessment and Plan:     Kaylyn Peña is a 25 y.o. female  at 32w1d by LMP c/w 11w US who presents for variable decelerations on NST in clinic.    #SIUP at 32w1d by LMP c/w 11wk, DYLAN 25  - prenatal care with [facility]  - Labor status: Not in labor.    #fetal status, Cat I  - reassuring  - continuous fetal monitoring    #variable deceleration at [facility] appointment for NST  - NST in triage reassuring, one small variable during prolonged monitoring  - BPP     #GBS unk  - PCN 5 mil units  loading dose, then 2.5mil units q4h, if inidcated    #rubella: immune    #HIV NR, Trep NR, HBsAg NR, Hep C unk, GCCT neg/neg    #blood type O+/-    #contraception: TBD    #HCM:  - Tdap: 5/27/25      Disposition:  - Discharge to home  - labor precautions given: She was counseled to call or return for vaginal bleeding, regular contractions, leakage of fluid or decreased fetal movement.  - fetal kick counts every evening  - follow-up with outpatient prenatal appointments    The patient was instructed to follow up in the [facility] office as directed by them.    Carla Allan MD, MPH         [1]   Past Medical History:  Diagnosis Date    Bilateral pneumothoraces 08/10/2024    No contraindication to deep vein thrombosis (DVT) prophylaxis 08/10/2024    Trauma 08/10/2024   [2]   Past Surgical History:  Procedure Laterality Date    OTHER ORTHOPEDIC SURGERY      Left hand debridement   [3] No Known Allergies  [4] No current facility-administered medications for this encounter.

## 2025-07-02 NOTE — PROGRESS NOTES
Pt presents to L&D from [facility] for fetal monitoring and possible US. Pt ambulated to [facility] 3 for assessment.     1143 TOCO and EFM applied, VSS. Pt reports +FM, denies LOF or VB. Pt was being seen at [facility] for an NST, growth US and GTT when a non-reassuring tracing occurred. Pt was sent immediately here for closer monitoring.     1155 Dr. Allan updated on pt status, orders for BPP received. Plan for pt to reschedule with [facility] for growth US.     1252 US at bedside    1325 Pt updated on POC    1345 Dr. Allan at bedside, POC discussed. Orders for discharge received.     1348 RN at bedside, precautions given and all questions answered. Pt discharged home in stable condition.

## 2025-07-03 LAB
EST. AVERAGE GLUCOSE BLD GHB EST-MCNC: 100 MG/DL
HBA1C MFR BLD: 5.1 % (ref 4–5.6)
HBV CORE AB SERPL QL IA: NONREACTIVE
HBV SURFACE AB SERPL IA-ACNC: 6.26 MIU/ML (ref 0–10)

## 2025-07-04 LAB — VZV IGG SER IA-ACNC: 2.74 S/CO

## 2025-07-14 NOTE — PROGRESS NOTES
Pt here today for OB follow up @ 34 weeks =EDC 8/26/2025  Pt states doing well with no bleeding,leaking fluid, pain/cramping  Followed BY [facility], 1hr gtt 100 Aic 5.1  No f/u   Reports +FM  Good # [phone]  Pharmacy Confirmed.[email or web address]

## 2025-07-15 ENCOUNTER — ROUTINE PRENATAL (OUTPATIENT)
Dept: OBGYN | Facility: CLINIC | Age: 25
End: 2025-07-15
Payer: COMMERCIAL

## 2025-07-15 VITALS — DIASTOLIC BLOOD PRESSURE: 65 MMHG | WEIGHT: 152 LBS | SYSTOLIC BLOOD PRESSURE: 109 MMHG | BODY MASS INDEX: 22.45 KG/M2

## 2025-07-15 DIAGNOSIS — O99.810 ABNORMAL GLUCOSE TOLERANCE IN PREGNANCY: ICD-10-CM

## 2025-07-15 DIAGNOSIS — O99.019 IRON DEFICIENCY ANEMIA DURING PREGNANCY: ICD-10-CM

## 2025-07-15 DIAGNOSIS — O09.93 HIGH RISK FOR INTRAPARTUM COMPLICATIONS IN THIRD TRIMESTER: Primary | ICD-10-CM

## 2025-07-15 DIAGNOSIS — D50.9 IRON DEFICIENCY ANEMIA DURING PREGNANCY: ICD-10-CM

## 2025-07-15 PROCEDURE — 3078F DIAST BP <80 MM HG: CPT | Performed by: STUDENT IN AN ORGANIZED HEALTH CARE EDUCATION/TRAINING PROGRAM

## 2025-07-15 PROCEDURE — 0502F SUBSEQUENT PRENATAL CARE: CPT | Performed by: STUDENT IN AN ORGANIZED HEALTH CARE EDUCATION/TRAINING PROGRAM

## 2025-07-15 PROCEDURE — 3074F SYST BP LT 130 MM HG: CPT | Performed by: STUDENT IN AN ORGANIZED HEALTH CARE EDUCATION/TRAINING PROGRAM

## 2025-07-15 ASSESSMENT — FIBROSIS 4 INDEX: FIB4 SCORE: 0.48

## 2025-07-15 NOTE — PROGRESS NOTES
OB Visit Note - 34w0d     Pregnancy complicated by:  Problem List[1]      SUBJECTIVE:  Kaylyn Peña is a 25 y.o.,  at 34w0d who presents for pregnancy follow-up. . She states the baby is moving. She had her last growth with MFM on 7/10 and it was in 32%. Mid-trimester labs done with them revealed a mild- anemia hgb/hct/plt 10.4/33.4/262, and an elevated 1hr GTT (144). Syphilis neg. They cleared her from growth US (resolved pyelectasis and growth is normal). Her 3hr is scheduled with [facility]. She knows to do weekly NSTs from here to the end of the pregnancy.     ROS:  She denies vaginal bleeding, leakage of fluid, or contractions.    She denies nausea or vomiting or headache    OBJECTIVE:  Vital Signs: /65   Wt 152 lb   LMP 2024   BMI 22.45 kg/m²   Appearance/Psychiatric: to be in no distress  Constitutional: The patient is well nourished.  Respiratory:Respiratory effort is normal.  Gastrointestinal: Abdomen is soft, gravid, non-tendern,no rashes, heart tones are present,   Extremities: no edema  FHR 150s  1. High risk for intrapartum complications in third trimester        2. Abnormal glucose tolerance in pregnancy        3. Iron deficiency anemia during pregnancy          Kaylyn Peña is a 25 y.o. female  at 34w0d here for JOJO. Doing well. Next appt and NST next week. 3hr scheduled with [facility]. Koffi start iron at home      The patient will follow up in 1 week(s). She was counseled to call or return for vaginal bleeding, regular contractions, leakage of fluid or decreased fetal movement.  Annette Hernandez DO, FACOG  [facility]           [1]   Patient Active Problem List  Diagnosis    Supervision of high-risk pregnancy, second trimester    Maternal tobacco use in second trimester    Marijuana use during pregnancy    Bipolar disorder in full remission (HCC)    Pregnancy complicated by history of child with Moebius syndrome    LGSIL on Pap smear of cervix    Lower abdominal  pain    Abnormal fetal ultrasound

## 2025-07-21 ENCOUNTER — OFFICE VISIT (OUTPATIENT)
Dept: OBGYN | Facility: CLINIC | Age: 25
End: 2025-07-21
Payer: COMMERCIAL

## 2025-07-21 VITALS — BODY MASS INDEX: 22.74 KG/M2 | SYSTOLIC BLOOD PRESSURE: 109 MMHG | DIASTOLIC BLOOD PRESSURE: 58 MMHG | WEIGHT: 154 LBS

## 2025-07-21 DIAGNOSIS — O99.891: ICD-10-CM

## 2025-07-21 DIAGNOSIS — O28.3 ABNORMAL FETAL ULTRASOUND: Primary | ICD-10-CM

## 2025-07-21 PROCEDURE — 59025 FETAL NON-STRESS TEST: CPT | Performed by: OBSTETRICS & GYNECOLOGY

## 2025-07-21 ASSESSMENT — FIBROSIS 4 INDEX: FIB4 SCORE: 0.48

## 2025-07-21 NOTE — PROCEDURES
NST Note  Kaylyn Peña, a  at 34w6d     Indication: abnormal fetal US    NST Interpretation:  Baseline 160, mod variability, + accels x2, no decels  Reactive    Pat Lozano M.D.

## 2025-07-29 ENCOUNTER — ROUTINE PRENATAL (OUTPATIENT)
Dept: OBGYN | Facility: CLINIC | Age: 25
End: 2025-07-29
Payer: COMMERCIAL

## 2025-07-29 ENCOUNTER — APPOINTMENT (OUTPATIENT)
Dept: OBGYN | Facility: CLINIC | Age: 25
End: 2025-07-29
Payer: COMMERCIAL

## 2025-07-29 ENCOUNTER — HOSPITAL ENCOUNTER (OUTPATIENT)
Facility: MEDICAL CENTER | Age: 25
End: 2025-07-29
Attending: STUDENT IN AN ORGANIZED HEALTH CARE EDUCATION/TRAINING PROGRAM | Admitting: OBSTETRICS & GYNECOLOGY
Payer: COMMERCIAL

## 2025-07-29 ENCOUNTER — APPOINTMENT (OUTPATIENT)
Dept: RADIOLOGY | Facility: MEDICAL CENTER | Age: 25
End: 2025-07-29
Attending: OBSTETRICS & GYNECOLOGY
Payer: COMMERCIAL

## 2025-07-29 ENCOUNTER — HOSPITAL ENCOUNTER (EMERGENCY)
Facility: MEDICAL CENTER | Age: 25
End: 2025-07-29
Attending: OBSTETRICS & GYNECOLOGY | Admitting: OBSTETRICS & GYNECOLOGY
Payer: COMMERCIAL

## 2025-07-29 VITALS — BODY MASS INDEX: 22.74 KG/M2 | SYSTOLIC BLOOD PRESSURE: 112 MMHG | DIASTOLIC BLOOD PRESSURE: 60 MMHG | WEIGHT: 154 LBS

## 2025-07-29 DIAGNOSIS — O99.810 ABNORMAL GLUCOSE TOLERANCE IN PREGNANCY: ICD-10-CM

## 2025-07-29 DIAGNOSIS — O09.93 HIGH-RISK PREGNANCY, THIRD TRIMESTER: Primary | ICD-10-CM

## 2025-07-29 DIAGNOSIS — O26.843 UTERINE SIZE-DATE DISCREPANCY IN THIRD TRIMESTER: ICD-10-CM

## 2025-07-29 DIAGNOSIS — O09.92 SUPERVISION OF HIGH-RISK PREGNANCY, SECOND TRIMESTER: ICD-10-CM

## 2025-07-29 DIAGNOSIS — O36.8390 VARIABLE FETAL HEART RATE DECELERATIONS, ANTEPARTUM: ICD-10-CM

## 2025-07-29 PROCEDURE — 87150 DNA/RNA AMPLIFIED PROBE: CPT

## 2025-07-29 PROCEDURE — 87081 CULTURE SCREEN ONLY: CPT

## 2025-07-29 ASSESSMENT — FIBROSIS 4 INDEX
FIB4 SCORE: 0.48
FIB4 SCORE: 0.48

## 2025-07-29 NOTE — PROGRESS NOTES
25 y.o.  EDC  (36 wks), GBS unk    Pt presents to L&D sent from the clinic by Dr. Hernandez for further eval for recurrent variable decels. Reports good FM. Denies VB/LOF/ctxs. EFM/TOCO applied, VSS.   1300: Dr. Lozano  notified of pt's arrival and current status, provider will ocme to bedside to see pt.  1340: Orders received from Dr. Lozano for growth US.  1343: Dr. Mae at bedside discussing POC with pt.  1358: US at bedside.  1442: Dr. Lozano at bedside discussing current status and discharge home.  1450: Patient discharged home with specific instruction to return to L&D/Physician ie.. Bleeding/ROM/decreased FM/labor/concerns for self or baby.  Patient denies questions or concerns regarding POC since arrival and POC to discharge home.  Patient ambulated out of hospital w/ FOB by side.

## 2025-07-29 NOTE — PROCEDURES
NST Note  Kaylyn Tessa Peña, a  at 36w0d     Indication: per [facility] MFM    NST Interpretation:  Baseline 155, moderate variability, positive accels, recurrent variable decels, CTX q 3min. Based on the recurrent variable decels, she was sent to LND for further evaluation    Annette Hernandez D.O.

## 2025-07-29 NOTE — PROGRESS NOTES
Pt here today for OB follow up @ 36 weeks, EDC 8/26/2025 +NST  Pt states doing well with no bleeding,leaking fluid or pain.  GBBS Today.  3hr GTT done yesterday  Reports +FM  Good # [phone]  [facility]acy [facility]

## 2025-07-29 NOTE — ED PROVIDER NOTES
"LABOR AND DELIVERY TRIAGE NOTE    PATIENT ID:  NAME:  Kaylyn Peña  MRN:               [id number]  YOB: 2000     25 y.o. female  at 36w0d.    Subjective: Pt came in for nonreassuring fetal tracing in clinic this morning.  She denies any pain, discharge, fluid loss, abnormal contractions.    Pregnancy complicated by fundal height mismanaged gestational age  Maternal use of vaping nicotine, marijuana use    positive for [place] contractions  negative pain   negative for LOF  negative for vaginal bleeding  positive for fetal movement    PNL:  Blood Type O+, Rubella immune, HIV neg, TrepAb neg, HBsAg NR, GC/CT neg/neg  1 HR GTT: ***  GBS -      ROS: Patient denies any fever chills, nausea, vomiting, headache, chest pain, shortness of breath, or dysuria or unusual swelling of hands or feet.     PMHx:   Past Medical History[1]     OB History    Para Term  AB Living   2 1 1   1   SAB IAB Ectopic Molar Multiple Live Births        1      # Outcome Date GA Lbr Nael/2nd Weight Sex Type Anes PTL Lv   2 Current            1 Term 10/20/21 40w0d   F Vag-Spont   CHRISTI       GYN: denies STIs, no cervical procedures    PSHx:  Past Surgical History[2]    Social Hx:  Social History[3]      Medications:  Medications Ordered Prior to Encounter[4]    Allergies:  NKDA      Objective:    Vitals:    25 1250   BP: 93/52   Pulse: 75   Resp: 16   Temp: 36.5 °C (97.7 °F)   TempSrc: Temporal   SpO2: 97%   Weight: 69.9 kg (154 lb)   Height: 1.753 m (5' 9\")     Temp (24hrs), Av.5 °C (97.7 °F), Min:36.5 °C (97.7 °F), Max:36.5 °C (97.7 °F)    General: No acute distress, resting comfortably in bed.  HEENT: normocephalic, nontraumatic, PERRLA, EOMI  Cardiovascular: Heart RRR with no murmurs, rubs or gallops. Distal Pulses 2+  Respiratory: symmetric chest expansion, lungs CTAB, with no wheezes, rales, rhonci  Abdomen: gravid, nontender  Musculoskeletal: YU spontaneously  Neuro: non focal with no numbness, " tingling or changes in sensation  Cervix:  ***cm/***%/***  FHRM: Baseline reactive, reassuring variability, + accels, no decels    Labs:   Results for orders placed or performed during the hospital encounter of 25   T.PALLIDUM AB FORTINO (SCREENING)    Collection Time: 25 10:30 AM   Result Value Ref Range    Syphilis, Treponemal Qual Non-Reactive Non-Reactive   CBC WITHOUT DIFFERENTIAL    Collection Time: 25 10:30 AM   Result Value Ref Range    WBC 9.2 4.8 - 10.8 K/uL    RBC 3.38 (L) 4.20 - 5.40 M/uL    Hemoglobin 10.6 (L) 12.0 - 16.0 g/dL    Hematocrit 33.4 (L) 37.0 - 47.0 %    MCV 98.8 (H) 81.4 - 97.8 fL    MCH 31.4 27.0 - 33.0 pg    MCHC 31.7 (L) 32.2 - 35.5 g/dL    RDW 47.5 35.9 - 50.0 fL    Platelet Count 262 164 - 446 K/uL    MPV 10.9 9.0 - 12.9 fL   VARICELLA ZOSTER IGG AB    Collection Time: 25 10:30 AM   Result Value Ref Range    Varicella Zoster IgG Ab 2.74 (H) <=0.99 S/CO   HEP C VIRUS ANTIBODY    Collection Time: 25 10:30 AM   Result Value Ref Range    Hepatitis C Antibody Non-Reactive Non-Reactive   HEMOGLOBIN A1C    Collection Time: 25 10:30 AM   Result Value Ref Range    Glycohemoglobin 5.1 4.0 - 5.6 %    Est Avg Glucose 100 mg/dL   HEP B SURFACE AB    Collection Time: 25 10:30 AM   Result Value Ref Range    Hep B Surface Antibody Quant 6.26 0.00 - 10.00 mIU/mL   HEP B CORE AB TOTAL    Collection Time: 25 10:30 AM   Result Value Ref Range    Hepatitis B Core Ab, Total NonReactive Non-Reactive         Assessment: 25 y.o. female  at 36w0d presents with nonreassuring fetal tracing from clinic    # Nonreassuring contractions  -Tracings in hospital are reactive and reassuring      #IUP at 36w0d gestation   -Fundal height measured at 32 weeks in clinic.  Growth US in hospital today reassuring.      - Patient is cleared to return home with family. Encouraged to see MD/DO for increased painful uterine contractions @ 3-5, vaginal bleeding, loss of fluid, or  other serious symptoms.      Discussed case with Dr. Lozano, [facility] Attending. Case was discussed and attending agreed with plan prior to discharge of patient.      Escobar Mae DO  PGY-1, [facility]           [1]   Past Medical History:  Diagnosis Date    Bilateral pneumothoraces 08/10/2024    No contraindication to deep vein thrombosis (DVT) prophylaxis 08/10/2024    Trauma 08/10/2024   [2]   Past Surgical History:  Procedure Laterality Date    OTHER ORTHOPEDIC SURGERY      Left hand debridement   [3]   Social History  Tobacco Use    Smoking status: Never   Substance Use Topics    Alcohol use: No    Drug use: No   [4]   No current facility-administered medications on file prior to encounter.     Current Outpatient Medications on File Prior to Encounter   Medication Sig Dispense Refill    ondansetron (ZOFRAN ODT) 4 MG TABLET DISPERSIBLE Disolve 1 Tablet by mouth every 6 hours as needed for Nausea/Vomiting. 20 Tablet 0    Prenatal Multivit-Min-Fe-FA (PRENATAL 1 + IRON PO) Take  by mouth.      acetaminophen (TYLENOL) 500 MG Tab Take 2 Tablets by mouth every 6 hours as needed for Mild Pain.

## 2025-07-29 NOTE — PROGRESS NOTES
OB Visit Note - 36w0d     Pregnancy complicated by:  Patient Active Problem List   Diagnosis    Supervision of high-risk pregnancy, second trimester    Maternal tobacco use in second trimester    Marijuana use during pregnancy    Bipolar disorder in full remission (HCC)    Pregnancy complicated by history of child with Moebius syndrome    LGSIL on Pap smear of cervix    Lower abdominal pain    Abnormal fetal ultrasound    Abnormal glucose tolerance in pregnancy    Uterine size-date discrepancy in third trimester    Variable fetal heart rate decelerations, antepartum       SUBJECTIVE:  Kaylyn Peña is a 25 y.o.,  at 36w0d who presents for pregnancy follow-up and NST.     She states the baby is moving. She had her last growth with MFM on 7/10 and it was in 32%. Mid-trimester labs done with them revealed a mild- anemia hgb/hct/plt 10.4/33.4/262, and an elevated 1hr GTT (144). Syphilis neg. They cleared her from growth US (resolved pyelectasis and growth is normal). Her 3hr is scheduled with [facility]. She has been doing weekly NSTs until the end of the pregnancy based on MF's recommendations.      She did her 3hr yesterday at [facility] but doesn't have the results yet.     ROS:  She denies vaginal bleeding, leakage of fluid, or contractions.    She denies nausea or vomiting or headache    OBJECTIVE:  Vital Signs: /60   Wt 154 lb   LMP 2024   BMI 22.74 kg/m²   Appearance/Psychiatric: to be in no distress  Constitutional: The patient is well nourished.  Respiratory:Respiratory effort is normal.  Gastrointestinal: Abdomen is soft, gravid, non-tendern,no rashes, heart tones are present, fundal height is not consistent with dates  Extremities: no edema  GBS collected  See NST for fetal heart documentation.   FH: 32    1. High-risk pregnancy, third trimester        2. Abnormal glucose tolerance in pregnancy        3. Uterine size-date discrepancy in third trimester        4. Variable fetal heart rate  decelerations, antepartum          Kaylyn Peña is a 25 y.o. female  at 36w0d here for JOJO/NST. She feels well and denies any contractions or leakage of fluid. Based on recurrent variable decels on her NST today and her fundal height measuring 4cm below, she was sent to LND for further evaluation. Patient agreeable. GBS collected. 3hr results pending.      The patient will follow up in 1 week(s). She was counseled to call or return for vaginal bleeding, regular contractions, leakage of fluid or decreased fetal movement.  Annette Hernandez DO, FACOG  [facility]

## 2025-07-31 LAB — GP B STREP DNA SPEC QL NAA+PROBE: POSITIVE

## 2025-08-04 ENCOUNTER — NON-PROVIDER VISIT (OUTPATIENT)
Dept: OBGYN | Facility: CLINIC | Age: 25
End: 2025-08-04
Payer: COMMERCIAL

## 2025-08-04 ENCOUNTER — APPOINTMENT (OUTPATIENT)
Dept: OBGYN | Facility: CLINIC | Age: 25
End: 2025-08-04
Payer: COMMERCIAL

## 2025-08-04 PROBLEM — O99.820 GBS (GROUP B STREPTOCOCCUS CARRIER), +RV CULTURE, CURRENTLY PREGNANT: Status: ACTIVE | Noted: 2025-08-04

## 2025-08-04 ASSESSMENT — FIBROSIS 4 INDEX: FIB4 SCORE: 0.48

## 2025-08-12 ENCOUNTER — NON-PROVIDER VISIT (OUTPATIENT)
Dept: OBGYN | Facility: CLINIC | Age: 25
End: 2025-08-12
Payer: COMMERCIAL

## 2025-08-12 ENCOUNTER — APPOINTMENT (OUTPATIENT)
Dept: OBGYN | Facility: CLINIC | Age: 25
End: 2025-08-12
Payer: COMMERCIAL

## 2025-08-18 ENCOUNTER — APPOINTMENT (OUTPATIENT)
Dept: OBGYN | Facility: CLINIC | Age: 25
End: 2025-08-18
Payer: COMMERCIAL

## 2025-08-19 ENCOUNTER — NON-PROVIDER VISIT (OUTPATIENT)
Dept: OBGYN | Facility: CLINIC | Age: 25
End: 2025-08-19
Payer: COMMERCIAL

## 2025-08-19 ENCOUNTER — ROUTINE PRENATAL (OUTPATIENT)
Dept: OBGYN | Facility: CLINIC | Age: 25
End: 2025-08-19
Payer: COMMERCIAL

## 2025-08-19 VITALS — DIASTOLIC BLOOD PRESSURE: 55 MMHG | WEIGHT: 154.5 LBS | BODY MASS INDEX: 22.82 KG/M2 | SYSTOLIC BLOOD PRESSURE: 112 MMHG

## 2025-08-19 DIAGNOSIS — O99.891: Primary | ICD-10-CM

## 2025-08-19 DIAGNOSIS — O36.8390 VARIABLE FETAL HEART RATE DECELERATIONS, ANTEPARTUM: ICD-10-CM

## 2025-08-19 PROCEDURE — 59025 FETAL NON-STRESS TEST: CPT | Performed by: OBSTETRICS & GYNECOLOGY

## 2025-08-19 PROCEDURE — 0502F SUBSEQUENT PRENATAL CARE: CPT | Performed by: OBSTETRICS & GYNECOLOGY

## 2025-08-19 ASSESSMENT — FIBROSIS 4 INDEX: FIB4 SCORE: 0.48

## 2025-08-20 ENCOUNTER — ANESTHESIA EVENT (OUTPATIENT)
Dept: ANESTHESIOLOGY | Facility: MEDICAL CENTER | Age: 25
End: 2025-08-20
Payer: COMMERCIAL

## 2025-08-20 ENCOUNTER — HOSPITAL ENCOUNTER (INPATIENT)
Facility: MEDICAL CENTER | Age: 25
LOS: 2 days | End: 2025-08-22
Attending: OBSTETRICS & GYNECOLOGY
Payer: COMMERCIAL

## 2025-08-20 ENCOUNTER — ANESTHESIA (OUTPATIENT)
Dept: ANESTHESIOLOGY | Facility: MEDICAL CENTER | Age: 25
End: 2025-08-20
Payer: COMMERCIAL

## 2025-08-20 ENCOUNTER — APPOINTMENT (OUTPATIENT)
Dept: OBGYN | Facility: MEDICAL CENTER | Age: 25
End: 2025-08-20
Attending: OBSTETRICS & GYNECOLOGY
Payer: COMMERCIAL

## 2025-08-20 LAB
BASOPHILS # BLD AUTO: 0.2 % (ref 0–1.8)
BASOPHILS # BLD: 0.02 K/UL (ref 0–0.12)
EOSINOPHIL # BLD AUTO: 0.02 K/UL (ref 0–0.51)
EOSINOPHIL NFR BLD: 0.2 % (ref 0–6.9)
ERYTHROCYTE [DISTWIDTH] IN BLOOD BY AUTOMATED COUNT: 43.1 FL (ref 35.9–50)
HCT VFR BLD AUTO: 35.4 % (ref 37–47)
HGB BLD-MCNC: 11.6 G/DL (ref 12–16)
HOLDING TUBE BB 8507: NORMAL
IMM GRANULOCYTES # BLD AUTO: 0.07 K/UL (ref 0–0.11)
IMM GRANULOCYTES NFR BLD AUTO: 0.7 % (ref 0–0.9)
LYMPHOCYTES # BLD AUTO: 1.07 K/UL (ref 1–4.8)
LYMPHOCYTES NFR BLD: 10.3 % (ref 22–41)
MCH RBC QN AUTO: 28.9 PG (ref 27–33)
MCHC RBC AUTO-ENTMCNC: 32.8 G/DL (ref 32.2–35.5)
MCV RBC AUTO: 88.3 FL (ref 81.4–97.8)
MONOCYTES # BLD AUTO: 0.56 K/UL (ref 0–0.85)
MONOCYTES NFR BLD AUTO: 5.4 % (ref 0–13.4)
NEUTROPHILS # BLD AUTO: 8.64 K/UL (ref 1.82–7.42)
NEUTROPHILS NFR BLD: 83.2 % (ref 44–72)
NRBC # BLD AUTO: 0 K/UL
NRBC BLD-RTO: 0 /100 WBC (ref 0–0.2)
PLATELET # BLD AUTO: 285 K/UL (ref 164–446)
PMV BLD AUTO: 10.8 FL (ref 9–12.9)
RBC # BLD AUTO: 4.01 M/UL (ref 4.2–5.4)
T PALLIDUM AB SER QL IA: NORMAL
WBC # BLD AUTO: 10.4 K/UL (ref 4.8–10.8)

## 2025-08-20 PROCEDURE — 10907ZC DRAINAGE OF AMNIOTIC FLUID, THERAPEUTIC FROM PRODUCTS OF CONCEPTION, VIA NATURAL OR ARTIFICIAL OPENING: ICD-10-PCS | Performed by: OBSTETRICS & GYNECOLOGY

## 2025-08-20 PROCEDURE — 304965 HCHG RECOVERY SERVICES

## 2025-08-20 PROCEDURE — 700101 HCHG RX REV CODE 250: Performed by: OBSTETRICS & GYNECOLOGY

## 2025-08-20 PROCEDURE — 59400 OBSTETRICAL CARE: CPT

## 2025-08-20 PROCEDURE — 86780 TREPONEMA PALLIDUM: CPT

## 2025-08-20 PROCEDURE — 36415 COLL VENOUS BLD VENIPUNCTURE: CPT

## 2025-08-20 PROCEDURE — 85025 COMPLETE CBC W/AUTO DIFF WBC: CPT

## 2025-08-20 PROCEDURE — 700111 HCHG RX REV CODE 636 W/ 250 OVERRIDE (IP): Performed by: ANESTHESIOLOGY

## 2025-08-20 PROCEDURE — 700105 HCHG RX REV CODE 258: Performed by: ANESTHESIOLOGY

## 2025-08-20 PROCEDURE — 59409 OBSTETRICAL CARE: CPT

## 2025-08-20 PROCEDURE — 700101 HCHG RX REV CODE 250: Performed by: ANESTHESIOLOGY

## 2025-08-20 PROCEDURE — 700105 HCHG RX REV CODE 258: Performed by: OBSTETRICS & GYNECOLOGY

## 2025-08-20 PROCEDURE — 303615 HCHG EPIDURAL/SPINAL ANESTHESIA FOR LABOR

## 2025-08-20 PROCEDURE — 700111 HCHG RX REV CODE 636 W/ 250 OVERRIDE (IP): Performed by: OBSTETRICS & GYNECOLOGY

## 2025-08-20 PROCEDURE — 770002 HCHG ROOM/CARE - OB PRIVATE (112)

## 2025-08-20 PROCEDURE — 10H07YZ INSERTION OF OTHER DEVICE INTO PRODUCTS OF CONCEPTION, VIA NATURAL OR ARTIFICIAL OPENING: ICD-10-PCS | Performed by: OBSTETRICS & GYNECOLOGY

## 2025-08-20 RX ORDER — ROPIVACAINE HYDROCHLORIDE 2 MG/ML
INJECTION, SOLUTION EPIDURAL; INFILTRATION; PERINEURAL CONTINUOUS
Status: DISCONTINUED | OUTPATIENT
Start: 2025-08-20 | End: 2025-08-22 | Stop reason: HOSPADM

## 2025-08-20 RX ORDER — SODIUM CHLORIDE, SODIUM LACTATE, POTASSIUM CHLORIDE, AND CALCIUM CHLORIDE .6; .31; .03; .02 G/100ML; G/100ML; G/100ML; G/100ML
250 INJECTION, SOLUTION INTRAVENOUS PRN
Status: DISCONTINUED | OUTPATIENT
Start: 2025-08-20 | End: 2025-08-20 | Stop reason: HOSPADM

## 2025-08-20 RX ORDER — ROPIVACAINE HYDROCHLORIDE 2 MG/ML
INJECTION, SOLUTION EPIDURAL; INFILTRATION; PERINEURAL CONTINUOUS
Status: DISCONTINUED | OUTPATIENT
Start: 2025-08-20 | End: 2025-08-20

## 2025-08-20 RX ORDER — ONDANSETRON 4 MG/1
4 TABLET, ORALLY DISINTEGRATING ORAL EVERY 6 HOURS PRN
Status: DISCONTINUED | OUTPATIENT
Start: 2025-08-20 | End: 2025-08-20 | Stop reason: HOSPADM

## 2025-08-20 RX ORDER — EPHEDRINE SULFATE 50 MG/ML
5 INJECTION, SOLUTION INTRAVENOUS
Status: DISCONTINUED | OUTPATIENT
Start: 2025-08-20 | End: 2025-08-20 | Stop reason: HOSPADM

## 2025-08-20 RX ORDER — ACETAMINOPHEN 500 MG
1000 TABLET ORAL EVERY 6 HOURS PRN
Status: DISCONTINUED | OUTPATIENT
Start: 2025-08-20 | End: 2025-08-22 | Stop reason: HOSPADM

## 2025-08-20 RX ORDER — ONDANSETRON 2 MG/ML
4 INJECTION INTRAMUSCULAR; INTRAVENOUS EVERY 6 HOURS PRN
Status: DISCONTINUED | OUTPATIENT
Start: 2025-08-20 | End: 2025-08-20 | Stop reason: HOSPADM

## 2025-08-20 RX ORDER — MISOPROSTOL 200 UG/1
600 TABLET ORAL
Status: DISCONTINUED | OUTPATIENT
Start: 2025-08-20 | End: 2025-08-22 | Stop reason: HOSPADM

## 2025-08-20 RX ORDER — IBUPROFEN 800 MG/1
800 TABLET, FILM COATED ORAL
Status: DISCONTINUED | OUTPATIENT
Start: 2025-08-20 | End: 2025-08-20 | Stop reason: HOSPADM

## 2025-08-20 RX ORDER — SODIUM CHLORIDE, SODIUM LACTATE, POTASSIUM CHLORIDE, CALCIUM CHLORIDE 600; 310; 30; 20 MG/100ML; MG/100ML; MG/100ML; MG/100ML
INJECTION, SOLUTION INTRAVENOUS CONTINUOUS
Status: DISCONTINUED | OUTPATIENT
Start: 2025-08-20 | End: 2025-08-22 | Stop reason: HOSPADM

## 2025-08-20 RX ORDER — IBUPROFEN 800 MG/1
800 TABLET, FILM COATED ORAL EVERY 8 HOURS PRN
Status: DISCONTINUED | OUTPATIENT
Start: 2025-08-20 | End: 2025-08-22 | Stop reason: HOSPADM

## 2025-08-20 RX ORDER — SODIUM CHLORIDE, SODIUM LACTATE, POTASSIUM CHLORIDE, CALCIUM CHLORIDE 600; 310; 30; 20 MG/100ML; MG/100ML; MG/100ML; MG/100ML
2000 INJECTION, SOLUTION INTRAVENOUS PRN
Status: DISCONTINUED | OUTPATIENT
Start: 2025-08-20 | End: 2025-08-22 | Stop reason: HOSPADM

## 2025-08-20 RX ORDER — DOCUSATE SODIUM 100 MG/1
100 CAPSULE, LIQUID FILLED ORAL 2 TIMES DAILY PRN
Status: DISCONTINUED | OUTPATIENT
Start: 2025-08-20 | End: 2025-08-22 | Stop reason: HOSPADM

## 2025-08-20 RX ORDER — LIDOCAINE HYDROCHLORIDE 10 MG/ML
20 INJECTION, SOLUTION INFILTRATION; PERINEURAL
Status: DISCONTINUED | OUTPATIENT
Start: 2025-08-20 | End: 2025-08-20 | Stop reason: HOSPADM

## 2025-08-20 RX ORDER — TERBUTALINE SULFATE 1 MG/ML
0.25 INJECTION SUBCUTANEOUS
Status: DISCONTINUED | OUTPATIENT
Start: 2025-08-20 | End: 2025-08-20 | Stop reason: HOSPADM

## 2025-08-20 RX ORDER — CALCIUM CARBONATE 500 MG/1
1000 TABLET, CHEWABLE ORAL EVERY 6 HOURS PRN
Status: DISCONTINUED | OUTPATIENT
Start: 2025-08-20 | End: 2025-08-22 | Stop reason: HOSPADM

## 2025-08-20 RX ORDER — ACETAMINOPHEN 500 MG
1000 TABLET ORAL
Status: DISCONTINUED | OUTPATIENT
Start: 2025-08-20 | End: 2025-08-20 | Stop reason: HOSPADM

## 2025-08-20 RX ORDER — SIMETHICONE 125 MG
125 TABLET,CHEWABLE ORAL 4 TIMES DAILY PRN
Status: DISCONTINUED | OUTPATIENT
Start: 2025-08-20 | End: 2025-08-22 | Stop reason: HOSPADM

## 2025-08-20 RX ORDER — VITAMIN A ACETATE, BETA CAROTENE, ASCORBIC ACID, CHOLECALCIFEROL, .ALPHA.-TOCOPHEROL ACETATE, DL-, THIAMINE MONONITRATE, RIBOFLAVIN, NIACINAMIDE, PYRIDOXINE HYDROCHLORIDE, FOLIC ACID, CYANOCOBALAMIN, CALCIUM CARBONATE, FERROUS FUMARATE, ZINC OXIDE, CUPRIC OXIDE 3080; 12; 120; 400; 1; 1.84; 3; 20; 22; 920; 25; 200; 27; 10; 2 [IU]/1; UG/1; MG/1; [IU]/1; MG/1; MG/1; MG/1; MG/1; MG/1; [IU]/1; MG/1; MG/1; MG/1; MG/1; MG/1
1 TABLET, FILM COATED ORAL
Status: DISCONTINUED | OUTPATIENT
Start: 2025-08-21 | End: 2025-08-22 | Stop reason: HOSPADM

## 2025-08-20 RX ORDER — LIDOCAINE HYDROCHLORIDE AND EPINEPHRINE 15; 5 MG/ML; UG/ML
INJECTION, SOLUTION EPIDURAL
Status: COMPLETED | OUTPATIENT
Start: 2025-08-20 | End: 2025-08-20

## 2025-08-20 RX ORDER — OXYTOCIN 10 [USP'U]/ML
10 INJECTION, SOLUTION INTRAMUSCULAR; INTRAVENOUS
Status: DISCONTINUED | OUTPATIENT
Start: 2025-08-20 | End: 2025-08-20 | Stop reason: HOSPADM

## 2025-08-20 RX ORDER — SODIUM CHLORIDE, SODIUM LACTATE, POTASSIUM CHLORIDE, AND CALCIUM CHLORIDE .6; .31; .03; .02 G/100ML; G/100ML; G/100ML; G/100ML
1000 INJECTION, SOLUTION INTRAVENOUS
Status: COMPLETED | OUTPATIENT
Start: 2025-08-20 | End: 2025-08-20

## 2025-08-20 RX ADMIN — ROPIVACAINE HYDROCHLORIDE: 2 INJECTION, SOLUTION EPIDURAL; INFILTRATION; PERINEURAL at 15:26

## 2025-08-20 RX ADMIN — SODIUM CHLORIDE 5 MILLION UNITS: 900 INJECTION INTRAVENOUS at 11:12

## 2025-08-20 RX ADMIN — WATER 2.5 MILLION UNITS: 1 INJECTION INTRAMUSCULAR; INTRAVENOUS; SUBCUTANEOUS at 19:21

## 2025-08-20 RX ADMIN — LIDOCAINE HYDROCHLORIDE,EPINEPHRINE BITARTRATE 3 ML: 15; .005 INJECTION, SOLUTION EPIDURAL; INFILTRATION; INTRACAUDAL; PERINEURAL at 15:27

## 2025-08-20 RX ADMIN — SODIUM CHLORIDE, POTASSIUM CHLORIDE, SODIUM LACTATE AND CALCIUM CHLORIDE 1000 ML: 600; 310; 30; 20 INJECTION, SOLUTION INTRAVENOUS at 15:15

## 2025-08-20 RX ADMIN — OXYTOCIN 20 UNITS: 10 INJECTION INTRAVENOUS at 21:10

## 2025-08-20 RX ADMIN — SODIUM CHLORIDE, POTASSIUM CHLORIDE, SODIUM LACTATE AND CALCIUM CHLORIDE: 600; 310; 30; 20 INJECTION, SOLUTION INTRAVENOUS at 11:20

## 2025-08-20 RX ADMIN — SODIUM CHLORIDE, POTASSIUM CHLORIDE, SODIUM LACTATE AND CALCIUM CHLORIDE: 600; 310; 30; 20 INJECTION, SOLUTION INTRAVENOUS at 17:55

## 2025-08-20 RX ADMIN — OXYTOCIN 1 MILLI-UNITS/MIN: 10 INJECTION INTRAVENOUS at 11:15

## 2025-08-20 RX ADMIN — OXYTOCIN 125 ML/HR: 10 INJECTION INTRAVENOUS at 21:45

## 2025-08-20 RX ADMIN — WATER 2.5 MILLION UNITS: 1 INJECTION INTRAMUSCULAR; INTRAVENOUS; SUBCUTANEOUS at 15:09

## 2025-08-20 SDOH — ECONOMIC STABILITY: TRANSPORTATION INSECURITY
IN THE PAST 12 MONTHS, HAS LACK OF RELIABLE TRANSPORTATION KEPT YOU FROM MEDICAL APPOINTMENTS, MEETINGS, WORK OR FROM GETTING THINGS NEEDED FOR DAILY LIVING?: NO

## 2025-08-20 SDOH — ECONOMIC STABILITY: TRANSPORTATION INSECURITY
IN THE PAST 12 MONTHS, HAS THE LACK OF TRANSPORTATION KEPT YOU FROM MEDICAL APPOINTMENTS OR FROM GETTING MEDICATIONS?: NO

## 2025-08-20 ASSESSMENT — PAIN DESCRIPTION - PAIN TYPE
TYPE: ACUTE PAIN

## 2025-08-20 ASSESSMENT — SOCIAL DETERMINANTS OF HEALTH (SDOH)
WITHIN THE LAST YEAR, HAVE TO BEEN RAPED OR FORCED TO HAVE ANY KIND OF SEXUAL ACTIVITY BY YOUR PARTNER OR EX-PARTNER?: NO
WITHIN THE LAST YEAR, HAVE YOU BEEN HUMILIATED OR EMOTIONALLY ABUSED IN OTHER WAYS BY YOUR PARTNER OR EX-PARTNER?: NO
IN THE PAST 12 MONTHS, HAS THE ELECTRIC, GAS, OIL, OR WATER COMPANY THREATENED TO SHUT OFF SERVICE IN YOUR HOME?: NO
WITHIN THE LAST YEAR, HAVE YOU BEEN KICKED, HIT, SLAPPED, OR OTHERWISE PHYSICALLY HURT BY YOUR PARTNER OR EX-PARTNER?: NO
WITHIN THE PAST 12 MONTHS, THE FOOD YOU BOUGHT JUST DIDN'T LAST AND YOU DIDN'T HAVE MONEY TO GET MORE: SOMETIMES TRUE
WITHIN THE LAST YEAR, HAVE YOU BEEN AFRAID OF YOUR PARTNER OR EX-PARTNER?: NO
WITHIN THE PAST 12 MONTHS, YOU WORRIED THAT YOUR FOOD WOULD RUN OUT BEFORE YOU GOT THE MONEY TO BUY MORE: SOMETIMES TRUE

## 2025-08-20 ASSESSMENT — PATIENT HEALTH QUESTIONNAIRE - PHQ9
SUM OF ALL RESPONSES TO PHQ9 QUESTIONS 1 AND 2: 0
2. FEELING DOWN, DEPRESSED, IRRITABLE, OR HOPELESS: NOT AT ALL
1. LITTLE INTEREST OR PLEASURE IN DOING THINGS: NOT AT ALL
1. LITTLE INTEREST OR PLEASURE IN DOING THINGS: NOT AT ALL
SUM OF ALL RESPONSES TO PHQ9 QUESTIONS 1 AND 2: 0
2. FEELING DOWN, DEPRESSED, IRRITABLE, OR HOPELESS: NOT AT ALL

## 2025-08-20 ASSESSMENT — FIBROSIS 4 INDEX: FIB4 SCORE: 0.48

## 2025-08-20 ASSESSMENT — PAIN SCALES - GENERAL: PAINLEVEL: 0 - NO PAIN

## 2025-08-20 ASSESSMENT — LIFESTYLE VARIABLES: ALCOHOL_USE: NO

## 2025-08-21 LAB
ERYTHROCYTE [DISTWIDTH] IN BLOOD BY AUTOMATED COUNT: 43.3 FL (ref 35.9–50)
HCT VFR BLD AUTO: 31.2 % (ref 37–47)
HGB BLD-MCNC: 10 G/DL (ref 12–16)
MCH RBC QN AUTO: 29.2 PG (ref 27–33)
MCHC RBC AUTO-ENTMCNC: 32.1 G/DL (ref 32.2–35.5)
MCV RBC AUTO: 91 FL (ref 81.4–97.8)
PLATELET # BLD AUTO: 232 K/UL (ref 164–446)
PMV BLD AUTO: 10.2 FL (ref 9–12.9)
RBC # BLD AUTO: 3.43 M/UL (ref 4.2–5.4)
WBC # BLD AUTO: 14.4 K/UL (ref 4.8–10.8)

## 2025-08-21 PROCEDURE — 85027 COMPLETE CBC AUTOMATED: CPT

## 2025-08-21 PROCEDURE — A9270 NON-COVERED ITEM OR SERVICE: HCPCS

## 2025-08-21 PROCEDURE — 36415 COLL VENOUS BLD VENIPUNCTURE: CPT

## 2025-08-21 PROCEDURE — 770002 HCHG ROOM/CARE - OB PRIVATE (112)

## 2025-08-21 PROCEDURE — RXMED WILLOW AMBULATORY MEDICATION CHARGE

## 2025-08-21 PROCEDURE — 700102 HCHG RX REV CODE 250 W/ 637 OVERRIDE(OP)

## 2025-08-21 RX ORDER — IBUPROFEN 800 MG/1
800 TABLET, FILM COATED ORAL EVERY 8 HOURS PRN
Qty: 30 TABLET | Refills: 0 | Status: SHIPPED | OUTPATIENT
Start: 2025-08-21 | End: 2025-08-22

## 2025-08-21 RX ORDER — ACETAMINOPHEN 500 MG
1000 TABLET ORAL EVERY 6 HOURS PRN
Qty: 30 TABLET | Refills: 0 | Status: SHIPPED | OUTPATIENT
Start: 2025-08-21 | End: 2025-08-22

## 2025-08-21 RX ORDER — PSEUDOEPHEDRINE HCL 30 MG
100 TABLET ORAL 2 TIMES DAILY PRN
Qty: 60 CAPSULE | Refills: 0 | Status: SHIPPED | OUTPATIENT
Start: 2025-08-21

## 2025-08-21 RX ADMIN — ACETAMINOPHEN 1000 MG: 500 TABLET ORAL at 06:34

## 2025-08-21 RX ADMIN — ACETAMINOPHEN 1000 MG: 500 TABLET ORAL at 16:11

## 2025-08-21 RX ADMIN — IBUPROFEN 800 MG: 800 TABLET, FILM COATED ORAL at 11:48

## 2025-08-21 RX ADMIN — ACETAMINOPHEN 1000 MG: 500 TABLET ORAL at 00:24

## 2025-08-21 ASSESSMENT — EDINBURGH POSTNATAL DEPRESSION SCALE (EPDS)
THE THOUGHT OF HARMING MYSELF HAS OCCURRED TO ME: NEVER
I HAVE BEEN SO UNHAPPY THAT I HAVE BEEN CRYING: NO, NEVER
I HAVE FELT SAD OR MISERABLE: NO, NOT AT ALL
I HAVE LOOKED FORWARD WITH ENJOYMENT TO THINGS: AS MUCH AS I EVER DID
I HAVE BEEN SO UNHAPPY THAT I HAVE HAD DIFFICULTY SLEEPING: NOT AT ALL
I HAVE BEEN ANXIOUS OR WORRIED FOR NO GOOD REASON: YES, SOMETIMES
I HAVE BLAMED MYSELF UNNECESSARILY WHEN THINGS WENT WRONG: YES, SOME OF THE TIME
THINGS HAVE BEEN GETTING ON TOP OF ME: YES, SOMETIMES I HAVEN'T BEEN COPING AS WELL AS USUAL
I HAVE FELT SCARED OR PANICKY FOR NO GOOD REASON: NO, NOT AT ALL
I HAVE BEEN ABLE TO LAUGH AND SEE THE FUNNY SIDE OF THINGS: AS MUCH AS I ALWAYS COULD

## 2025-08-21 ASSESSMENT — PAIN DESCRIPTION - PAIN TYPE
TYPE: ACUTE PAIN

## 2025-08-22 ENCOUNTER — PHARMACY VISIT (OUTPATIENT)
Dept: PHARMACY | Facility: MEDICAL CENTER | Age: 25
End: 2025-08-22
Payer: COMMERCIAL

## 2025-08-22 VITALS
SYSTOLIC BLOOD PRESSURE: 124 MMHG | BODY MASS INDEX: 22.81 KG/M2 | OXYGEN SATURATION: 100 % | TEMPERATURE: 97.1 F | WEIGHT: 154 LBS | DIASTOLIC BLOOD PRESSURE: 76 MMHG | HEIGHT: 69 IN | HEART RATE: 72 BPM | RESPIRATION RATE: 18 BRPM

## 2025-08-22 PROCEDURE — A9270 NON-COVERED ITEM OR SERVICE: HCPCS

## 2025-08-22 PROCEDURE — 700102 HCHG RX REV CODE 250 W/ 637 OVERRIDE(OP)

## 2025-08-22 RX ADMIN — IBUPROFEN 800 MG: 800 TABLET, FILM COATED ORAL at 08:15

## 2025-08-22 RX ADMIN — ACETAMINOPHEN 1000 MG: 500 TABLET ORAL at 11:53

## 2025-08-22 RX ADMIN — PRENATAL WITH FERROUS FUM AND FOLIC ACID 1 TABLET: 3080; 920; 120; 400; 22; 1.84; 3; 20; 10; 1; 12; 200; 27; 25; 2 TABLET ORAL at 08:15

## 2025-08-22 RX ADMIN — ACETAMINOPHEN 1000 MG: 500 TABLET ORAL at 01:02

## 2025-08-22 ASSESSMENT — PAIN DESCRIPTION - PAIN TYPE
TYPE: ACUTE PAIN
TYPE: ACUTE PAIN

## 2025-08-26 ENCOUNTER — APPOINTMENT (OUTPATIENT)
Dept: OBGYN | Facility: CLINIC | Age: 25
End: 2025-08-26
Payer: COMMERCIAL